# Patient Record
Sex: FEMALE | Race: WHITE | NOT HISPANIC OR LATINO | Employment: OTHER | URBAN - METROPOLITAN AREA
[De-identification: names, ages, dates, MRNs, and addresses within clinical notes are randomized per-mention and may not be internally consistent; named-entity substitution may affect disease eponyms.]

---

## 2017-01-01 ENCOUNTER — TELEPHONE (OUTPATIENT)
Dept: ONCOLOGY | Facility: HOSPITAL | Age: 82
End: 2017-01-01

## 2017-01-01 ENCOUNTER — APPOINTMENT (OUTPATIENT)
Dept: GENERAL RADIOLOGY | Facility: HOSPITAL | Age: 82
End: 2017-01-01

## 2017-01-01 ENCOUNTER — INFUSION (OUTPATIENT)
Dept: ONCOLOGY | Facility: HOSPITAL | Age: 82
End: 2017-01-01

## 2017-01-01 ENCOUNTER — APPOINTMENT (OUTPATIENT)
Dept: ONCOLOGY | Facility: CLINIC | Age: 82
End: 2017-01-01

## 2017-01-01 ENCOUNTER — OFFICE VISIT (OUTPATIENT)
Dept: ONCOLOGY | Facility: CLINIC | Age: 82
End: 2017-01-01

## 2017-01-01 ENCOUNTER — APPOINTMENT (OUTPATIENT)
Dept: CT IMAGING | Facility: HOSPITAL | Age: 82
End: 2017-01-01

## 2017-01-01 ENCOUNTER — APPOINTMENT (OUTPATIENT)
Dept: ONCOLOGY | Facility: HOSPITAL | Age: 82
End: 2017-01-01

## 2017-01-01 ENCOUNTER — APPOINTMENT (OUTPATIENT)
Dept: LAB | Facility: HOSPITAL | Age: 82
End: 2017-01-01

## 2017-01-01 ENCOUNTER — LAB (OUTPATIENT)
Dept: LAB | Facility: HOSPITAL | Age: 82
End: 2017-01-01

## 2017-01-01 ENCOUNTER — HOSPITAL ENCOUNTER (OUTPATIENT)
Dept: ULTRASOUND IMAGING | Facility: HOSPITAL | Age: 82
Discharge: HOME | End: 2017-04-06
Attending: INTERNAL MEDICINE | Admitting: INTERNAL MEDICINE

## 2017-01-01 ENCOUNTER — ANESTHESIA (OUTPATIENT)
Dept: GASTROENTEROLOGY | Facility: HOSPITAL | Age: 82
End: 2017-01-01

## 2017-01-01 ENCOUNTER — APPOINTMENT (OUTPATIENT)
Dept: MRI IMAGING | Facility: HOSPITAL | Age: 82
End: 2017-01-01

## 2017-01-01 ENCOUNTER — TELEPHONE (OUTPATIENT)
Dept: ONCOLOGY | Facility: CLINIC | Age: 82
End: 2017-01-01

## 2017-01-01 ENCOUNTER — HOSPITAL ENCOUNTER (OUTPATIENT)
Dept: PET IMAGING | Facility: HOSPITAL | Age: 82
Discharge: HOME | End: 2017-03-21
Attending: INTERNAL MEDICINE | Admitting: INTERNAL MEDICINE

## 2017-01-01 ENCOUNTER — APPOINTMENT (OUTPATIENT)
Dept: ULTRASOUND IMAGING | Facility: HOSPITAL | Age: 82
End: 2017-01-01

## 2017-01-01 ENCOUNTER — HOSPITAL ENCOUNTER (INPATIENT)
Facility: HOSPITAL | Age: 82
LOS: 3 days | Discharge: HOME | End: 2017-03-07
Attending: EMERGENCY MEDICINE | Admitting: INTERNAL MEDICINE

## 2017-01-01 ENCOUNTER — HOSPITAL ENCOUNTER (INPATIENT)
Facility: HOSPITAL | Age: 82
LOS: 3 days | End: 2017-04-11
Attending: EMERGENCY MEDICINE | Admitting: INTERNAL MEDICINE

## 2017-01-01 ENCOUNTER — HOSPITAL ENCOUNTER (INPATIENT)
Facility: HOSPITAL | Age: 82
LOS: 3 days | Discharge: HOME | End: 2017-03-27
Attending: INTERNAL MEDICINE | Admitting: INTERNAL MEDICINE

## 2017-01-01 ENCOUNTER — APPOINTMENT (OUTPATIENT)
Dept: CARDIOLOGY | Facility: HOSPITAL | Age: 82
End: 2017-01-01
Attending: INTERNAL MEDICINE

## 2017-01-01 ENCOUNTER — ANESTHESIA EVENT (OUTPATIENT)
Dept: GASTROENTEROLOGY | Facility: HOSPITAL | Age: 82
End: 2017-01-01

## 2017-01-01 VITALS
BODY MASS INDEX: 22.53 KG/M2 | HEIGHT: 64 IN | OXYGEN SATURATION: 96 % | SYSTOLIC BLOOD PRESSURE: 125 MMHG | TEMPERATURE: 98.1 F | HEART RATE: 95 BPM | DIASTOLIC BLOOD PRESSURE: 84 MMHG | WEIGHT: 132 LBS | RESPIRATION RATE: 18 BRPM

## 2017-01-01 VITALS
WEIGHT: 139.5 LBS | RESPIRATION RATE: 18 BRPM | SYSTOLIC BLOOD PRESSURE: 97 MMHG | OXYGEN SATURATION: 95 % | HEIGHT: 64 IN | DIASTOLIC BLOOD PRESSURE: 63 MMHG | BODY MASS INDEX: 23.82 KG/M2 | TEMPERATURE: 97 F | HEART RATE: 93 BPM

## 2017-01-01 VITALS
OXYGEN SATURATION: 90 % | BODY MASS INDEX: 23.87 KG/M2 | HEIGHT: 64 IN | DIASTOLIC BLOOD PRESSURE: 54 MMHG | SYSTOLIC BLOOD PRESSURE: 89 MMHG | TEMPERATURE: 97 F | WEIGHT: 139.8 LBS

## 2017-01-01 VITALS
HEART RATE: 104 BPM | RESPIRATION RATE: 12 BRPM | TEMPERATURE: 97.5 F | DIASTOLIC BLOOD PRESSURE: 70 MMHG | BODY MASS INDEX: 21.81 KG/M2 | HEIGHT: 63 IN | OXYGEN SATURATION: 100 % | SYSTOLIC BLOOD PRESSURE: 104 MMHG | WEIGHT: 123.1 LBS

## 2017-01-01 VITALS
BODY MASS INDEX: 21.46 KG/M2 | SYSTOLIC BLOOD PRESSURE: 112 MMHG | HEIGHT: 63 IN | HEART RATE: 82 BPM | DIASTOLIC BLOOD PRESSURE: 62 MMHG | RESPIRATION RATE: 12 BRPM | TEMPERATURE: 98.3 F | WEIGHT: 121.1 LBS

## 2017-01-01 VITALS
DIASTOLIC BLOOD PRESSURE: 62 MMHG | OXYGEN SATURATION: 99 % | BODY MASS INDEX: 22.19 KG/M2 | RESPIRATION RATE: 16 BRPM | SYSTOLIC BLOOD PRESSURE: 112 MMHG | TEMPERATURE: 97.5 F | HEART RATE: 95 BPM | HEIGHT: 62 IN | WEIGHT: 120.6 LBS

## 2017-01-01 VITALS
HEART RATE: 82 BPM | SYSTOLIC BLOOD PRESSURE: 110 MMHG | BODY MASS INDEX: 25.73 KG/M2 | TEMPERATURE: 97.9 F | RESPIRATION RATE: 18 BRPM | HEIGHT: 62 IN | DIASTOLIC BLOOD PRESSURE: 70 MMHG | WEIGHT: 139.8 LBS

## 2017-01-01 VITALS
BODY MASS INDEX: 21.84 KG/M2 | WEIGHT: 119.4 LBS | DIASTOLIC BLOOD PRESSURE: 75 MMHG | TEMPERATURE: 98.3 F | HEART RATE: 105 BPM | SYSTOLIC BLOOD PRESSURE: 127 MMHG

## 2017-01-01 VITALS
RESPIRATION RATE: 14 BRPM | HEART RATE: 104 BPM | BODY MASS INDEX: 21.86 KG/M2 | OXYGEN SATURATION: 98 % | SYSTOLIC BLOOD PRESSURE: 100 MMHG | DIASTOLIC BLOOD PRESSURE: 62 MMHG | WEIGHT: 123.4 LBS | TEMPERATURE: 97.5 F | HEIGHT: 63 IN

## 2017-01-01 VITALS
DIASTOLIC BLOOD PRESSURE: 77 MMHG | WEIGHT: 120.4 LBS | HEART RATE: 111 BPM | SYSTOLIC BLOOD PRESSURE: 120 MMHG | TEMPERATURE: 98 F | BODY MASS INDEX: 21.33 KG/M2

## 2017-01-01 VITALS
BODY MASS INDEX: 24.8 KG/M2 | HEIGHT: 62 IN | HEART RATE: 89 BPM | OXYGEN SATURATION: 98 % | SYSTOLIC BLOOD PRESSURE: 112 MMHG | TEMPERATURE: 97.5 F | RESPIRATION RATE: 16 BRPM | WEIGHT: 134.8 LBS | DIASTOLIC BLOOD PRESSURE: 58 MMHG

## 2017-01-01 VITALS
HEIGHT: 62 IN | BODY MASS INDEX: 25.32 KG/M2 | SYSTOLIC BLOOD PRESSURE: 138 MMHG | WEIGHT: 137.6 LBS | RESPIRATION RATE: 16 BRPM | TEMPERATURE: 97.6 F | DIASTOLIC BLOOD PRESSURE: 74 MMHG | OXYGEN SATURATION: 97 % | HEART RATE: 104 BPM

## 2017-01-01 VITALS
DIASTOLIC BLOOD PRESSURE: 70 MMHG | OXYGEN SATURATION: 97 % | RESPIRATION RATE: 14 BRPM | HEIGHT: 63 IN | WEIGHT: 122.4 LBS | HEART RATE: 104 BPM | SYSTOLIC BLOOD PRESSURE: 116 MMHG | TEMPERATURE: 97.4 F | BODY MASS INDEX: 21.69 KG/M2

## 2017-01-01 VITALS
DIASTOLIC BLOOD PRESSURE: 74 MMHG | RESPIRATION RATE: 16 BRPM | SYSTOLIC BLOOD PRESSURE: 112 MMHG | WEIGHT: 129.7 LBS | HEIGHT: 62 IN | BODY MASS INDEX: 23.87 KG/M2 | HEART RATE: 84 BPM | TEMPERATURE: 97.6 F | OXYGEN SATURATION: 100 %

## 2017-01-01 DIAGNOSIS — C22.1 CHOLANGIOCARCINOMA (HCC): ICD-10-CM

## 2017-01-01 DIAGNOSIS — T45.1X5A CHEMOTHERAPY-INDUCED THROMBOCYTOPENIA: ICD-10-CM

## 2017-01-01 DIAGNOSIS — C22.1 CHOLANGIOCARCINOMA (HCC): Primary | ICD-10-CM

## 2017-01-01 DIAGNOSIS — E11.9 TYPE 2 DIABETES MELLITUS WITHOUT COMPLICATION, WITHOUT LONG-TERM CURRENT USE OF INSULIN (HCC): ICD-10-CM

## 2017-01-01 DIAGNOSIS — Z45.2 ENCOUNTER FOR ADJUSTMENT OR MANAGEMENT OF VASCULAR ACCESS DEVICE: Primary | ICD-10-CM

## 2017-01-01 DIAGNOSIS — E80.6 HYPERBILIRUBINEMIA: ICD-10-CM

## 2017-01-01 DIAGNOSIS — R53.1 WEAKNESS: ICD-10-CM

## 2017-01-01 DIAGNOSIS — R18.0 MALIGNANT ASCITES: Primary | ICD-10-CM

## 2017-01-01 DIAGNOSIS — R18.8 OTHER ASCITES: ICD-10-CM

## 2017-01-01 DIAGNOSIS — E83.42 HYPOMAGNESEMIA: ICD-10-CM

## 2017-01-01 DIAGNOSIS — G89.3 CANCER RELATED PAIN: ICD-10-CM

## 2017-01-01 DIAGNOSIS — N39.0 ACUTE UTI: Primary | ICD-10-CM

## 2017-01-01 DIAGNOSIS — E83.39 HYPERPHOSPHATEMIA: ICD-10-CM

## 2017-01-01 DIAGNOSIS — N39.0 UTI (URINARY TRACT INFECTION) WITH PYURIA: ICD-10-CM

## 2017-01-01 DIAGNOSIS — T45.1X5A ANEMIA ASSOCIATED WITH CHEMOTHERAPY: ICD-10-CM

## 2017-01-01 DIAGNOSIS — N17.9 ACUTE RENAL FAILURE, UNSPECIFIED ACUTE RENAL FAILURE TYPE (HCC): Primary | ICD-10-CM

## 2017-01-01 DIAGNOSIS — D64.81 ANEMIA ASSOCIATED WITH CHEMOTHERAPY: ICD-10-CM

## 2017-01-01 DIAGNOSIS — E80.6 HYPERBILIRUBINEMIA: Primary | ICD-10-CM

## 2017-01-01 DIAGNOSIS — A41.9 SEPSIS, DUE TO UNSPECIFIED ORGANISM: ICD-10-CM

## 2017-01-01 DIAGNOSIS — R53.1 GENERALIZED WEAKNESS: ICD-10-CM

## 2017-01-01 DIAGNOSIS — I48.91 ATRIAL FIBRILLATION WITH RVR (HCC): ICD-10-CM

## 2017-01-01 DIAGNOSIS — K83.1 BILE DUCT OBSTRUCTION: ICD-10-CM

## 2017-01-01 DIAGNOSIS — R50.9 FEVER, UNSPECIFIED FEVER CAUSE: ICD-10-CM

## 2017-01-01 DIAGNOSIS — D69.59 CHEMOTHERAPY-INDUCED THROMBOCYTOPENIA: ICD-10-CM

## 2017-01-01 DIAGNOSIS — M79.89 SWELLING OF LOWER EXTREMITY: ICD-10-CM

## 2017-01-01 DIAGNOSIS — D72.829 LEUKOCYTOSIS, UNSPECIFIED TYPE: ICD-10-CM

## 2017-01-01 LAB
ABO + RH BLD: NORMAL
ABO + RH BLD: NORMAL
ABO GROUP BLD: NORMAL
ACETONE BLD QL: NEGATIVE
ALBUMIN FLD-MCNC: 0.5 G/DL
ALBUMIN SERPL-MCNC: 1.8 G/DL (ref 3.5–5.2)
ALBUMIN SERPL-MCNC: 1.8 G/DL (ref 3.5–5.2)
ALBUMIN SERPL-MCNC: 1.9 G/DL (ref 3.5–5.2)
ALBUMIN SERPL-MCNC: 2 G/DL (ref 3.5–5.2)
ALBUMIN SERPL-MCNC: 2.1 G/DL (ref 3.5–5.2)
ALBUMIN SERPL-MCNC: 2.2 G/DL (ref 3.5–5.2)
ALBUMIN SERPL-MCNC: 2.3 G/DL (ref 3.5–5.2)
ALBUMIN SERPL-MCNC: 2.4 G/DL (ref 3.5–5.2)
ALBUMIN SERPL-MCNC: 2.4 G/DL (ref 3.5–5.2)
ALBUMIN SERPL-MCNC: 2.5 G/DL (ref 3.5–5.2)
ALBUMIN SERPL-MCNC: 2.5 G/DL (ref 3.5–5.2)
ALBUMIN SERPL-MCNC: 2.6 G/DL (ref 3.5–5.2)
ALBUMIN SERPL-MCNC: 2.6 G/DL (ref 3.5–5.2)
ALBUMIN SERPL-MCNC: 2.7 G/DL (ref 3.5–5.2)
ALBUMIN/GLOB SERPL: 0.5 G/DL
ALBUMIN/GLOB SERPL: 0.5 G/DL (ref 1.1–2.4)
ALBUMIN/GLOB SERPL: 0.6 G/DL
ALBUMIN/GLOB SERPL: 0.6 G/DL (ref 1.1–2.4)
ALBUMIN/GLOB SERPL: 0.7 G/DL (ref 1.1–2.4)
ALP SERPL-CCNC: 157 U/L (ref 39–117)
ALP SERPL-CCNC: 170 U/L (ref 39–117)
ALP SERPL-CCNC: 173 U/L (ref 39–117)
ALP SERPL-CCNC: 176 U/L (ref 39–117)
ALP SERPL-CCNC: 183 U/L (ref 39–117)
ALP SERPL-CCNC: 184 U/L (ref 39–117)
ALP SERPL-CCNC: 186 U/L (ref 39–117)
ALP SERPL-CCNC: 188 U/L (ref 38–116)
ALP SERPL-CCNC: 190 U/L (ref 38–116)
ALP SERPL-CCNC: 191 U/L (ref 38–116)
ALP SERPL-CCNC: 195 U/L (ref 38–116)
ALP SERPL-CCNC: 203 U/L (ref 38–116)
ALP SERPL-CCNC: 204 U/L (ref 38–116)
ALP SERPL-CCNC: 205 U/L (ref 38–116)
ALP SERPL-CCNC: 216 U/L (ref 38–116)
ALP SERPL-CCNC: 219 U/L (ref 39–117)
ALP SERPL-CCNC: 225 U/L (ref 39–117)
ALP SERPL-CCNC: 229 U/L (ref 38–116)
ALT SERPL W P-5'-P-CCNC: 10 U/L (ref 0–33)
ALT SERPL W P-5'-P-CCNC: 15 U/L (ref 0–33)
ALT SERPL W P-5'-P-CCNC: 15 U/L (ref 0–33)
ALT SERPL W P-5'-P-CCNC: 15 U/L (ref 1–33)
ALT SERPL W P-5'-P-CCNC: 17 U/L (ref 1–33)
ALT SERPL W P-5'-P-CCNC: 18 U/L (ref 1–33)
ALT SERPL W P-5'-P-CCNC: 19 U/L (ref 0–33)
ALT SERPL W P-5'-P-CCNC: 21 U/L (ref 0–33)
ALT SERPL W P-5'-P-CCNC: 22 U/L (ref 0–33)
ALT SERPL W P-5'-P-CCNC: 22 U/L (ref 0–33)
ALT SERPL W P-5'-P-CCNC: 22 U/L (ref 1–33)
ALT SERPL W P-5'-P-CCNC: 23 U/L (ref 0–33)
ALT SERPL W P-5'-P-CCNC: 23 U/L (ref 1–33)
ALT SERPL W P-5'-P-CCNC: 24 U/L (ref 1–33)
ALT SERPL W P-5'-P-CCNC: 25 U/L (ref 1–33)
ALT SERPL W P-5'-P-CCNC: 25 U/L (ref 1–33)
ALT SERPL W P-5'-P-CCNC: 31 U/L (ref 1–33)
ALT SERPL W P-5'-P-CCNC: 9 U/L (ref 0–33)
AMORPH URATE CRY URNS QL MICRO: ABNORMAL /HPF
ANION GAP SERPL CALCULATED.3IONS-SCNC: 10.3 MMOL/L
ANION GAP SERPL CALCULATED.3IONS-SCNC: 10.4 MMOL/L
ANION GAP SERPL CALCULATED.3IONS-SCNC: 10.4 MMOL/L
ANION GAP SERPL CALCULATED.3IONS-SCNC: 10.6 MMOL/L
ANION GAP SERPL CALCULATED.3IONS-SCNC: 10.9 MMOL/L
ANION GAP SERPL CALCULATED.3IONS-SCNC: 10.9 MMOL/L
ANION GAP SERPL CALCULATED.3IONS-SCNC: 11.2 MMOL/L
ANION GAP SERPL CALCULATED.3IONS-SCNC: 11.3 MMOL/L
ANION GAP SERPL CALCULATED.3IONS-SCNC: 11.5 MMOL/L
ANION GAP SERPL CALCULATED.3IONS-SCNC: 11.5 MMOL/L
ANION GAP SERPL CALCULATED.3IONS-SCNC: 11.7 MMOL/L
ANION GAP SERPL CALCULATED.3IONS-SCNC: 12.1 MMOL/L
ANION GAP SERPL CALCULATED.3IONS-SCNC: 12.9 MMOL/L
ANION GAP SERPL CALCULATED.3IONS-SCNC: 13.3 MMOL/L
ANION GAP SERPL CALCULATED.3IONS-SCNC: 15 MMOL/L
ANION GAP SERPL CALCULATED.3IONS-SCNC: 9.4 MMOL/L
ANION GAP SERPL CALCULATED.3IONS-SCNC: 9.6 MMOL/L
ANION GAP SERPL CALCULATED.3IONS-SCNC: 9.9 MMOL/L
ANION GAP SERPL CALCULATED.3IONS-SCNC: 9.9 MMOL/L
ANISOCYTOSIS BLD QL: ABNORMAL
APPEARANCE FLD: ABNORMAL
APTT PPP: 40.3 SECONDS (ref 22.7–35.4)
AST SERPL-CCNC: 20 U/L (ref 0–32)
AST SERPL-CCNC: 20 U/L (ref 0–32)
AST SERPL-CCNC: 26 U/L (ref 1–32)
AST SERPL-CCNC: 27 U/L (ref 1–32)
AST SERPL-CCNC: 28 U/L (ref 1–32)
AST SERPL-CCNC: 29 U/L (ref 0–32)
AST SERPL-CCNC: 30 U/L (ref 0–32)
AST SERPL-CCNC: 32 U/L (ref 0–32)
AST SERPL-CCNC: 33 U/L (ref 1–32)
AST SERPL-CCNC: 35 U/L (ref 0–32)
AST SERPL-CCNC: 37 U/L (ref 1–32)
AST SERPL-CCNC: 39 U/L (ref 0–32)
AST SERPL-CCNC: 40 U/L (ref 0–32)
AST SERPL-CCNC: 40 U/L (ref 1–32)
AST SERPL-CCNC: 40 U/L (ref 1–32)
AST SERPL-CCNC: 41 U/L (ref 0–32)
AST SERPL-CCNC: 44 U/L (ref 1–32)
AST SERPL-CCNC: 53 U/L (ref 1–32)
BACTERIA BLD CULT: ABNORMAL
BACTERIA FLD CULT: NORMAL
BACTERIA SPEC AEROBE CULT: ABNORMAL
BACTERIA SPEC AEROBE CULT: ABNORMAL
BACTERIA SPEC AEROBE CULT: NORMAL
BACTERIA UR QL AUTO: ABNORMAL /HPF
BACTERIA UR QL AUTO: ABNORMAL /HPF
BASOPHILS # BLD AUTO: 0 10*3/MM3 (ref 0–0.2)
BASOPHILS # BLD AUTO: 0.01 10*3/MM3 (ref 0–0.1)
BASOPHILS # BLD AUTO: 0.01 10*3/MM3 (ref 0–0.2)
BASOPHILS # BLD AUTO: 0.01 10*3/MM3 (ref 0–0.2)
BASOPHILS # BLD AUTO: 0.02 10*3/MM3 (ref 0–0.1)
BASOPHILS # BLD AUTO: 0.02 10*3/MM3 (ref 0–0.2)
BASOPHILS # BLD AUTO: 0.03 10*3/MM3 (ref 0–0.1)
BASOPHILS # BLD AUTO: 0.03 10*3/MM3 (ref 0–0.2)
BASOPHILS # BLD AUTO: 0.03 10*3/MM3 (ref 0–0.2)
BASOPHILS # BLD AUTO: 0.04 10*3/MM3 (ref 0–0.1)
BASOPHILS # BLD AUTO: 0.04 10*3/MM3 (ref 0–0.2)
BASOPHILS # BLD AUTO: 0.04 10*3/MM3 (ref 0–0.2)
BASOPHILS # BLD AUTO: 0.05 10*3/MM3 (ref 0–0.1)
BASOPHILS # BLD AUTO: 0.07 10*3/MM3 (ref 0–0.1)
BASOPHILS # BLD AUTO: 0.08 10*3/MM3 (ref 0–0.1)
BASOPHILS # BLD AUTO: 0.09 10*3/MM3 (ref 0–0.1)
BASOPHILS NFR BLD AUTO: 0 % (ref 0–1.5)
BASOPHILS NFR BLD AUTO: 0.1 % (ref 0–1.1)
BASOPHILS NFR BLD AUTO: 0.1 % (ref 0–1.5)
BASOPHILS NFR BLD AUTO: 0.1 % (ref 0–1.5)
BASOPHILS NFR BLD AUTO: 0.2 % (ref 0–1.1)
BASOPHILS NFR BLD AUTO: 0.2 % (ref 0–1.5)
BASOPHILS NFR BLD AUTO: 0.4 % (ref 0–1.1)
BASOPHILS NFR BLD AUTO: 0.4 % (ref 0–1.5)
BASOPHILS NFR BLD AUTO: 0.4 % (ref 0–1.5)
BASOPHILS NFR BLD AUTO: 0.5 % (ref 0–1.5)
BASOPHILS NFR BLD AUTO: 0.5 % (ref 0–1.5)
BASOPHILS NFR BLD AUTO: 0.6 % (ref 0–1.1)
BASOPHILS NFR BLD AUTO: 0.6 % (ref 0–1.1)
BASOPHILS NFR BLD AUTO: 0.7 % (ref 0–1.1)
BASOPHILS NFR BLD AUTO: 1.2 % (ref 0–1.1)
BH BB BLOOD EXPIRATION DATE: NORMAL
BH BB BLOOD EXPIRATION DATE: NORMAL
BH BB BLOOD TYPE BARCODE: 5100
BH BB BLOOD TYPE BARCODE: 5100
BH BB DISPENSE STATUS: NORMAL
BH BB DISPENSE STATUS: NORMAL
BH BB PRODUCT CODE: NORMAL
BH BB PRODUCT CODE: NORMAL
BH BB UNIT NUMBER: NORMAL
BH BB UNIT NUMBER: NORMAL
BILIRUB SERPL-MCNC: 1.1 MG/DL (ref 0.1–1.2)
BILIRUB SERPL-MCNC: 1.4 MG/DL (ref 0.1–1.2)
BILIRUB SERPL-MCNC: 1.5 MG/DL (ref 0.1–1.2)
BILIRUB SERPL-MCNC: 1.6 MG/DL (ref 0.1–1.2)
BILIRUB SERPL-MCNC: 1.6 MG/DL (ref 0.1–1.2)
BILIRUB SERPL-MCNC: 1.8 MG/DL (ref 0.1–1.2)
BILIRUB SERPL-MCNC: 2 MG/DL (ref 0.1–1.2)
BILIRUB SERPL-MCNC: 2.2 MG/DL (ref 0.1–1.2)
BILIRUB SERPL-MCNC: 2.4 MG/DL (ref 0.1–1.2)
BILIRUB SERPL-MCNC: 2.5 MG/DL (ref 0.1–1.2)
BILIRUB SERPL-MCNC: 4.2 MG/DL (ref 0.1–1.2)
BILIRUB SERPL-MCNC: 4.3 MG/DL (ref 0.1–1.2)
BILIRUB SERPL-MCNC: 5.7 MG/DL (ref 0.1–1.2)
BILIRUB SERPL-MCNC: 6.1 MG/DL (ref 0.1–1.2)
BILIRUB SERPL-MCNC: 7.2 MG/DL (ref 0.1–1.2)
BILIRUB SERPL-MCNC: 7.7 MG/DL (ref 0.1–1.2)
BILIRUB SERPL-MCNC: 8 MG/DL (ref 0.1–1.2)
BILIRUB SERPL-MCNC: 8.2 MG/DL (ref 0.1–1.2)
BILIRUB UR QL STRIP: ABNORMAL
BILIRUB UR QL STRIP: ABNORMAL
BILIRUB UR QL STRIP: NEGATIVE
BLD GP AB SCN SERPL QL: NEGATIVE
BUN BLD-MCNC: 10 MG/DL (ref 6–20)
BUN BLD-MCNC: 11 MG/DL (ref 6–20)
BUN BLD-MCNC: 12 MG/DL (ref 6–20)
BUN BLD-MCNC: 12 MG/DL (ref 8–23)
BUN BLD-MCNC: 13 MG/DL (ref 6–20)
BUN BLD-MCNC: 13 MG/DL (ref 6–20)
BUN BLD-MCNC: 14 MG/DL (ref 6–20)
BUN BLD-MCNC: 15 MG/DL (ref 8–23)
BUN BLD-MCNC: 16 MG/DL (ref 8–23)
BUN BLD-MCNC: 18 MG/DL (ref 6–20)
BUN BLD-MCNC: 19 MG/DL (ref 6–20)
BUN BLD-MCNC: 48 MG/DL (ref 8–23)
BUN BLD-MCNC: 52 MG/DL (ref 8–23)
BUN BLD-MCNC: 55 MG/DL (ref 8–23)
BUN BLD-MCNC: 8 MG/DL (ref 6–20)
BUN BLD-MCNC: 8 MG/DL (ref 8–23)
BUN BLD-MCNC: 9 MG/DL (ref 8–23)
BUN/CREAT SERPL: 16 (ref 7–25)
BUN/CREAT SERPL: 16.7 (ref 7.3–30)
BUN/CREAT SERPL: 18 (ref 7–25)
BUN/CREAT SERPL: 19.5 (ref 7–25)
BUN/CREAT SERPL: 20.4 (ref 7.3–30)
BUN/CREAT SERPL: 20.4 (ref 7.3–30)
BUN/CREAT SERPL: 21.1 (ref 7–25)
BUN/CREAT SERPL: 21.8 (ref 7.3–30)
BUN/CREAT SERPL: 22 (ref 7.3–30)
BUN/CREAT SERPL: 22 (ref 7.3–30)
BUN/CREAT SERPL: 22.1 (ref 7–25)
BUN/CREAT SERPL: 22.4 (ref 7–25)
BUN/CREAT SERPL: 24.1 (ref 7.3–30)
BUN/CREAT SERPL: 28.1 (ref 7.3–30)
BUN/CREAT SERPL: 28.3 (ref 7–25)
BUN/CREAT SERPL: 29.5 (ref 7.3–30)
BUN/CREAT SERPL: 35.7 (ref 7–25)
BUN/CREAT SERPL: 36.6 (ref 7–25)
BUN/CREAT SERPL: 40.9 (ref 7–25)
CALCIUM SPEC-SCNC: 7.6 MG/DL (ref 8.6–10.5)
CALCIUM SPEC-SCNC: 7.8 MG/DL (ref 8.6–10.5)
CALCIUM SPEC-SCNC: 7.8 MG/DL (ref 8.6–10.5)
CALCIUM SPEC-SCNC: 7.9 MG/DL (ref 8.5–10.2)
CALCIUM SPEC-SCNC: 7.9 MG/DL (ref 8.5–10.2)
CALCIUM SPEC-SCNC: 7.9 MG/DL (ref 8.6–10.5)
CALCIUM SPEC-SCNC: 7.9 MG/DL (ref 8.6–10.5)
CALCIUM SPEC-SCNC: 8.1 MG/DL (ref 8.5–10.2)
CALCIUM SPEC-SCNC: 8.1 MG/DL (ref 8.6–10.5)
CALCIUM SPEC-SCNC: 8.1 MG/DL (ref 8.6–10.5)
CALCIUM SPEC-SCNC: 8.2 MG/DL (ref 8.5–10.2)
CALCIUM SPEC-SCNC: 8.2 MG/DL (ref 8.5–10.2)
CALCIUM SPEC-SCNC: 8.2 MG/DL (ref 8.6–10.5)
CALCIUM SPEC-SCNC: 8.4 MG/DL (ref 8.5–10.2)
CALCIUM SPEC-SCNC: 8.5 MG/DL (ref 8.5–10.2)
CALCIUM SPEC-SCNC: 8.6 MG/DL (ref 8.5–10.2)
CALCIUM SPEC-SCNC: 8.7 MG/DL (ref 8.5–10.2)
CALCIUM SPEC-SCNC: 8.7 MG/DL (ref 8.6–10.5)
CALCIUM SPEC-SCNC: 8.8 MG/DL (ref 8.6–10.5)
CHLORIDE SERPL-SCNC: 100 MMOL/L (ref 98–107)
CHLORIDE SERPL-SCNC: 100 MMOL/L (ref 98–107)
CHLORIDE SERPL-SCNC: 101 MMOL/L (ref 98–107)
CHLORIDE SERPL-SCNC: 102 MMOL/L (ref 98–107)
CHLORIDE SERPL-SCNC: 102 MMOL/L (ref 98–107)
CHLORIDE SERPL-SCNC: 103 MMOL/L (ref 98–107)
CHLORIDE SERPL-SCNC: 94 MMOL/L (ref 98–107)
CHLORIDE SERPL-SCNC: 95 MMOL/L (ref 98–107)
CHLORIDE SERPL-SCNC: 95 MMOL/L (ref 98–107)
CHLORIDE SERPL-SCNC: 96 MMOL/L (ref 98–107)
CHLORIDE SERPL-SCNC: 96 MMOL/L (ref 98–107)
CHLORIDE SERPL-SCNC: 98 MMOL/L (ref 98–107)
CHLORIDE SERPL-SCNC: 98 MMOL/L (ref 98–107)
CHLORIDE SERPL-SCNC: 99 MMOL/L (ref 98–107)
CHLORIDE UR-SCNC: 34 MMOL/L
CK SERPL-CCNC: 14 U/L (ref 20–180)
CLARITY UR: ABNORMAL
CO2 SERPL-SCNC: 17.7 MMOL/L (ref 22–29)
CO2 SERPL-SCNC: 17.7 MMOL/L (ref 22–29)
CO2 SERPL-SCNC: 18 MMOL/L (ref 22–29)
CO2 SERPL-SCNC: 20.1 MMOL/L (ref 22–29)
CO2 SERPL-SCNC: 20.6 MMOL/L (ref 22–29)
CO2 SERPL-SCNC: 21.5 MMOL/L (ref 22–29)
CO2 SERPL-SCNC: 21.7 MMOL/L (ref 22–29)
CO2 SERPL-SCNC: 21.9 MMOL/L (ref 22–29)
CO2 SERPL-SCNC: 22.1 MMOL/L (ref 22–29)
CO2 SERPL-SCNC: 22.3 MMOL/L (ref 22–29)
CO2 SERPL-SCNC: 22.4 MMOL/L (ref 22–29)
CO2 SERPL-SCNC: 22.8 MMOL/L (ref 22–29)
CO2 SERPL-SCNC: 23.1 MMOL/L (ref 22–29)
CO2 SERPL-SCNC: 23.4 MMOL/L (ref 22–29)
CO2 SERPL-SCNC: 23.6 MMOL/L (ref 22–29)
CO2 SERPL-SCNC: 24.1 MMOL/L (ref 22–29)
CO2 SERPL-SCNC: 25.1 MMOL/L (ref 22–29)
CO2 SERPL-SCNC: 25.5 MMOL/L (ref 22–29)
CO2 SERPL-SCNC: 26.6 MMOL/L (ref 22–29)
COLOR FLD: YELLOW
COLOR UR: ABNORMAL
CREAT BLD-MCNC: 0.44 MG/DL (ref 0.6–1.1)
CREAT BLD-MCNC: 0.48 MG/DL (ref 0.6–1.1)
CREAT BLD-MCNC: 0.49 MG/DL (ref 0.6–1.1)
CREAT BLD-MCNC: 0.5 MG/DL (ref 0.57–1)
CREAT BLD-MCNC: 0.5 MG/DL (ref 0.57–1)
CREAT BLD-MCNC: 0.5 MG/DL (ref 0.6–1.1)
CREAT BLD-MCNC: 0.53 MG/DL (ref 0.57–1)
CREAT BLD-MCNC: 0.55 MG/DL (ref 0.6–1.1)
CREAT BLD-MCNC: 0.57 MG/DL (ref 0.57–1)
CREAT BLD-MCNC: 0.58 MG/DL (ref 0.6–1.1)
CREAT BLD-MCNC: 0.59 MG/DL (ref 0.6–1.1)
CREAT BLD-MCNC: 0.64 MG/DL (ref 0.6–1.1)
CREAT BLD-MCNC: 0.67 MG/DL (ref 0.57–1)
CREAT BLD-MCNC: 0.68 MG/DL (ref 0.57–1)
CREAT BLD-MCNC: 0.82 MG/DL (ref 0.57–1)
CREAT BLD-MCNC: 0.93 MG/DL (ref 0.6–1.1)
CREAT BLD-MCNC: 1.27 MG/DL (ref 0.57–1)
CREAT BLD-MCNC: 1.31 MG/DL (ref 0.57–1)
CREAT BLD-MCNC: 1.54 MG/DL (ref 0.57–1)
CREAT BLDA-MCNC: 0.4 MG/DL (ref 0.6–1.3)
CREAT UR-MCNC: 115.7 MG/DL
CYTO UR: NORMAL
D-LACTATE SERPL-SCNC: 1.8 MMOL/L (ref 0.5–2)
D-LACTATE SERPL-SCNC: 2.9 MMOL/L (ref 0.5–2)
D-LACTATE SERPL-SCNC: 3.7 MMOL/L (ref 0.5–2)
D-LACTATE SERPL-SCNC: 4.4 MMOL/L (ref 0.5–2)
DACRYOCYTES BLD QL SMEAR: NORMAL
DEPRECATED RDW RBC AUTO: 48.8 FL (ref 37–49)
DEPRECATED RDW RBC AUTO: 51.4 FL (ref 37–49)
DEPRECATED RDW RBC AUTO: 55.5 FL (ref 37–49)
DEPRECATED RDW RBC AUTO: 57.7 FL (ref 37–49)
DEPRECATED RDW RBC AUTO: 60.9 FL (ref 37–49)
DEPRECATED RDW RBC AUTO: 66.6 FL (ref 37–54)
DEPRECATED RDW RBC AUTO: 71.6 FL (ref 37–54)
DEPRECATED RDW RBC AUTO: 72.4 FL (ref 37–54)
DEPRECATED RDW RBC AUTO: 74.9 FL (ref 37–54)
DEPRECATED RDW RBC AUTO: 76.2 FL (ref 37–54)
DEPRECATED RDW RBC AUTO: 77.2 FL (ref 37–49)
DEPRECATED RDW RBC AUTO: 77.7 FL (ref 37–54)
DEPRECATED RDW RBC AUTO: 77.9 FL (ref 37–49)
DEPRECATED RDW RBC AUTO: 79.6 FL (ref 37–49)
DEPRECATED RDW RBC AUTO: 82.3 FL (ref 37–54)
DEPRECATED RDW RBC AUTO: 82.6 FL (ref 37–49)
DEPRECATED RDW RBC AUTO: 83.5 FL (ref 37–54)
DEPRECATED RDW RBC AUTO: 83.6 FL (ref 37–54)
DEPRECATED RDW RBC AUTO: 87.1 FL (ref 37–49)
EOSINOPHIL # BLD AUTO: 0 10*3/MM3 (ref 0–0.36)
EOSINOPHIL # BLD AUTO: 0 10*3/MM3 (ref 0–0.36)
EOSINOPHIL # BLD AUTO: 0.04 10*3/MM3 (ref 0–0.36)
EOSINOPHIL # BLD AUTO: 0.04 10*3/MM3 (ref 0–0.7)
EOSINOPHIL # BLD AUTO: 0.06 10*3/MM3 (ref 0–0.36)
EOSINOPHIL # BLD AUTO: 0.07 10*3/MM3 (ref 0–0.36)
EOSINOPHIL # BLD AUTO: 0.07 10*3/MM3 (ref 0–0.7)
EOSINOPHIL # BLD AUTO: 0.08 10*3/MM3 (ref 0–0.36)
EOSINOPHIL # BLD AUTO: 0.08 10*3/MM3 (ref 0–0.36)
EOSINOPHIL # BLD AUTO: 0.1 10*3/MM3 (ref 0–0.7)
EOSINOPHIL # BLD AUTO: 0.11 10*3/MM3 (ref 0–0.36)
EOSINOPHIL # BLD AUTO: 0.11 10*3/MM3 (ref 0–0.7)
EOSINOPHIL # BLD AUTO: 0.15 10*3/MM3 (ref 0–0.36)
EOSINOPHIL # BLD AUTO: 0.15 10*3/MM3 (ref 0–0.36)
EOSINOPHIL # BLD AUTO: 0.18 10*3/MM3 (ref 0–0.7)
EOSINOPHIL # BLD AUTO: 0.19 10*3/MM3 (ref 0–0.7)
EOSINOPHIL # BLD AUTO: 0.22 10*3/MM3 (ref 0–0.7)
EOSINOPHIL # BLD AUTO: 0.25 10*3/MM3 (ref 0–0.7)
EOSINOPHIL # BLD MANUAL: 0.14 10*3/MM3 (ref 0–0.7)
EOSINOPHIL NFR BLD AUTO: 0 % (ref 1–5)
EOSINOPHIL NFR BLD AUTO: 0 % (ref 1–5)
EOSINOPHIL NFR BLD AUTO: 0.4 % (ref 1–5)
EOSINOPHIL NFR BLD AUTO: 0.5 % (ref 0.3–6.2)
EOSINOPHIL NFR BLD AUTO: 0.6 % (ref 1–5)
EOSINOPHIL NFR BLD AUTO: 0.8 % (ref 1–5)
EOSINOPHIL NFR BLD AUTO: 0.9 % (ref 0.3–6.2)
EOSINOPHIL NFR BLD AUTO: 0.9 % (ref 1–5)
EOSINOPHIL NFR BLD AUTO: 0.9 % (ref 1–5)
EOSINOPHIL NFR BLD AUTO: 1 % (ref 0.3–6.2)
EOSINOPHIL NFR BLD AUTO: 1.2 % (ref 0.3–6.2)
EOSINOPHIL NFR BLD AUTO: 1.6 % (ref 1–5)
EOSINOPHIL NFR BLD AUTO: 1.8 % (ref 0.3–6.2)
EOSINOPHIL NFR BLD AUTO: 1.9 % (ref 1–5)
EOSINOPHIL NFR BLD AUTO: 2.3 % (ref 1–5)
EOSINOPHIL NFR BLD AUTO: 3.1 % (ref 0.3–6.2)
EOSINOPHIL NFR BLD AUTO: 3.2 % (ref 0.3–6.2)
EOSINOPHIL NFR BLD AUTO: 3.3 % (ref 0.3–6.2)
EOSINOPHIL NFR BLD MANUAL: 1 % (ref 0.3–6.2)
ERYTHROCYTE [DISTWIDTH] IN BLOOD BY AUTOMATED COUNT: 14.3 % (ref 11.7–14.5)
ERYTHROCYTE [DISTWIDTH] IN BLOOD BY AUTOMATED COUNT: 15.1 % (ref 11.7–14.5)
ERYTHROCYTE [DISTWIDTH] IN BLOOD BY AUTOMATED COUNT: 15.8 % (ref 11.7–14.5)
ERYTHROCYTE [DISTWIDTH] IN BLOOD BY AUTOMATED COUNT: 17.3 % (ref 11.7–14.5)
ERYTHROCYTE [DISTWIDTH] IN BLOOD BY AUTOMATED COUNT: 17.7 % (ref 11.7–14.5)
ERYTHROCYTE [DISTWIDTH] IN BLOOD BY AUTOMATED COUNT: 18.4 % (ref 11.7–13)
ERYTHROCYTE [DISTWIDTH] IN BLOOD BY AUTOMATED COUNT: 18.7 % (ref 11.7–13)
ERYTHROCYTE [DISTWIDTH] IN BLOOD BY AUTOMATED COUNT: 19.5 % (ref 11.7–14.5)
ERYTHROCYTE [DISTWIDTH] IN BLOOD BY AUTOMATED COUNT: 20.6 % (ref 11.7–14.5)
ERYTHROCYTE [DISTWIDTH] IN BLOOD BY AUTOMATED COUNT: 21.8 % (ref 11.7–14.5)
ERYTHROCYTE [DISTWIDTH] IN BLOOD BY AUTOMATED COUNT: 22 % (ref 11.7–13)
ERYTHROCYTE [DISTWIDTH] IN BLOOD BY AUTOMATED COUNT: 22.1 % (ref 11.7–13)
ERYTHROCYTE [DISTWIDTH] IN BLOOD BY AUTOMATED COUNT: 22.4 % (ref 11.7–13)
ERYTHROCYTE [DISTWIDTH] IN BLOOD BY AUTOMATED COUNT: 22.5 % (ref 11.7–14.5)
ERYTHROCYTE [DISTWIDTH] IN BLOOD BY AUTOMATED COUNT: 23 % (ref 11.7–13)
ERYTHROCYTE [DISTWIDTH] IN BLOOD BY AUTOMATED COUNT: 23.1 % (ref 11.7–13)
ERYTHROCYTE [DISTWIDTH] IN BLOOD BY AUTOMATED COUNT: 23.2 % (ref 11.7–13)
ERYTHROCYTE [DISTWIDTH] IN BLOOD BY AUTOMATED COUNT: 23.4 % (ref 11.7–14.5)
ERYTHROCYTE [DISTWIDTH] IN BLOOD BY AUTOMATED COUNT: 23.7 % (ref 11.7–13)
FERRITIN SERPL-MCNC: 1196 NG/ML (ref 13–150)
FOLATE SERPL-MCNC: 10 NG/ML (ref 4.78–24.2)
GFR SERPL CREATININE-BSD FRML MDRD: 100 ML/MIN/1.73
GFR SERPL CREATININE-BSD FRML MDRD: 102 ML/MIN/1.73
GFR SERPL CREATININE-BSD FRML MDRD: 106 ML/MIN/1.73
GFR SERPL CREATININE-BSD FRML MDRD: 111 ML/MIN/1.73
GFR SERPL CREATININE-BSD FRML MDRD: 119 ML/MIN/1.73
GFR SERPL CREATININE-BSD FRML MDRD: 122 ML/MIN/1.73
GFR SERPL CREATININE-BSD FRML MDRD: 124 ML/MIN/1.73
GFR SERPL CREATININE-BSD FRML MDRD: 138 ML/MIN/1.73
GFR SERPL CREATININE-BSD FRML MDRD: 32 ML/MIN/1.73
GFR SERPL CREATININE-BSD FRML MDRD: 39 ML/MIN/1.73
GFR SERPL CREATININE-BSD FRML MDRD: 40 ML/MIN/1.73
GFR SERPL CREATININE-BSD FRML MDRD: 58 ML/MIN/1.73
GFR SERPL CREATININE-BSD FRML MDRD: 67 ML/MIN/1.73
GFR SERPL CREATININE-BSD FRML MDRD: 83 ML/MIN/1.73
GFR SERPL CREATININE-BSD FRML MDRD: 85 ML/MIN/1.73
GFR SERPL CREATININE-BSD FRML MDRD: 89 ML/MIN/1.73
GFR SERPL CREATININE-BSD FRML MDRD: 98 ML/MIN/1.73
GLOBULIN UR ELPH-MCNC: 3.3 GM/DL
GLOBULIN UR ELPH-MCNC: 3.4 GM/DL
GLOBULIN UR ELPH-MCNC: 3.4 GM/DL
GLOBULIN UR ELPH-MCNC: 3.5 GM/DL
GLOBULIN UR ELPH-MCNC: 3.5 GM/DL (ref 1.8–3.5)
GLOBULIN UR ELPH-MCNC: 3.6 GM/DL (ref 1.8–3.5)
GLOBULIN UR ELPH-MCNC: 3.7 GM/DL (ref 1.8–3.5)
GLOBULIN UR ELPH-MCNC: 3.8 GM/DL (ref 1.8–3.5)
GLOBULIN UR ELPH-MCNC: 3.9 GM/DL
GLOBULIN UR ELPH-MCNC: 3.9 GM/DL (ref 1.8–3.5)
GLOBULIN UR ELPH-MCNC: 4 GM/DL (ref 1.8–3.5)
GLOBULIN UR ELPH-MCNC: 4.2 GM/DL
GLOBULIN UR ELPH-MCNC: 4.2 GM/DL (ref 1.8–3.5)
GLOBULIN UR ELPH-MCNC: 4.4 GM/DL (ref 1.8–3.5)
GLOBULIN UR ELPH-MCNC: 4.5 GM/DL (ref 1.8–3.5)
GLOBULIN UR ELPH-MCNC: 4.6 GM/DL
GLUCOSE BLD-MCNC: 110 MG/DL (ref 74–124)
GLUCOSE BLD-MCNC: 123 MG/DL (ref 65–99)
GLUCOSE BLD-MCNC: 128 MG/DL (ref 65–99)
GLUCOSE BLD-MCNC: 132 MG/DL (ref 65–99)
GLUCOSE BLD-MCNC: 136 MG/DL (ref 65–99)
GLUCOSE BLD-MCNC: 143 MG/DL (ref 65–99)
GLUCOSE BLD-MCNC: 151 MG/DL (ref 74–124)
GLUCOSE BLD-MCNC: 158 MG/DL (ref 65–99)
GLUCOSE BLD-MCNC: 162 MG/DL (ref 65–99)
GLUCOSE BLD-MCNC: 163 MG/DL (ref 65–99)
GLUCOSE BLD-MCNC: 164 MG/DL (ref 74–124)
GLUCOSE BLD-MCNC: 172 MG/DL (ref 74–124)
GLUCOSE BLD-MCNC: 176 MG/DL (ref 74–124)
GLUCOSE BLD-MCNC: 184 MG/DL (ref 65–99)
GLUCOSE BLD-MCNC: 186 MG/DL (ref 74–124)
GLUCOSE BLD-MCNC: 188 MG/DL (ref 74–124)
GLUCOSE BLD-MCNC: 227 MG/DL (ref 74–124)
GLUCOSE BLD-MCNC: 268 MG/DL (ref 74–124)
GLUCOSE BLD-MCNC: 96 MG/DL (ref 65–99)
GLUCOSE BLDC GLUCOMTR-MCNC: 114 MG/DL (ref 70–130)
GLUCOSE BLDC GLUCOMTR-MCNC: 123 MG/DL (ref 70–130)
GLUCOSE BLDC GLUCOMTR-MCNC: 149 MG/DL (ref 70–130)
GLUCOSE BLDC GLUCOMTR-MCNC: 156 MG/DL (ref 70–130)
GLUCOSE UR STRIP-MCNC: ABNORMAL MG/DL
GLUCOSE UR STRIP-MCNC: NEGATIVE MG/DL
GLUCOSE UR STRIP-MCNC: NEGATIVE MG/DL
GRAM STN SPEC: NORMAL
GRAM STN SPEC: NORMAL
HCT VFR BLD AUTO: 17.8 % (ref 35.6–45.5)
HCT VFR BLD AUTO: 23.4 % (ref 35.6–45.5)
HCT VFR BLD AUTO: 24 % (ref 35.6–45.5)
HCT VFR BLD AUTO: 25.1 % (ref 35.6–45.5)
HCT VFR BLD AUTO: 26 % (ref 34–45)
HCT VFR BLD AUTO: 26.3 % (ref 35.6–45.5)
HCT VFR BLD AUTO: 26.8 % (ref 34–45)
HCT VFR BLD AUTO: 27.2 % (ref 34–45)
HCT VFR BLD AUTO: 27.3 % (ref 35.6–45.5)
HCT VFR BLD AUTO: 27.4 % (ref 34–45)
HCT VFR BLD AUTO: 27.6 % (ref 35.6–45.5)
HCT VFR BLD AUTO: 27.8 % (ref 34–45)
HCT VFR BLD AUTO: 28 % (ref 35.6–45.5)
HCT VFR BLD AUTO: 28.9 % (ref 34–45)
HCT VFR BLD AUTO: 29.9 % (ref 34–45)
HCT VFR BLD AUTO: 30 % (ref 34–45)
HCT VFR BLD AUTO: 30.9 % (ref 34–45)
HCT VFR BLD AUTO: 32.4 % (ref 34–45)
HCT VFR BLD AUTO: 33.4 % (ref 35.6–45.5)
HGB BLD-MCNC: 10 G/DL (ref 11.5–14.9)
HGB BLD-MCNC: 11.2 G/DL (ref 11.5–14.9)
HGB BLD-MCNC: 11.3 G/DL (ref 11.9–15.5)
HGB BLD-MCNC: 6.4 G/DL (ref 11.9–15.5)
HGB BLD-MCNC: 8 G/DL (ref 11.9–15.5)
HGB BLD-MCNC: 8.2 G/DL (ref 11.9–15.5)
HGB BLD-MCNC: 8.7 G/DL (ref 11.9–15.5)
HGB BLD-MCNC: 8.9 G/DL (ref 11.5–14.9)
HGB BLD-MCNC: 9 G/DL (ref 11.5–14.9)
HGB BLD-MCNC: 9.2 G/DL (ref 11.5–14.9)
HGB BLD-MCNC: 9.2 G/DL (ref 11.5–14.9)
HGB BLD-MCNC: 9.3 G/DL (ref 11.9–15.5)
HGB BLD-MCNC: 9.5 G/DL (ref 11.9–15.5)
HGB BLD-MCNC: 9.6 G/DL (ref 11.5–14.9)
HGB BLD-MCNC: 9.7 G/DL (ref 11.5–14.9)
HGB BLD-MCNC: 9.9 G/DL (ref 11.9–15.5)
HGB BLD-MCNC: 9.9 G/DL (ref 11.9–15.5)
HGB UR QL STRIP.AUTO: ABNORMAL
HGB UR QL STRIP.AUTO: NEGATIVE
HGB UR QL STRIP.AUTO: NEGATIVE
HOLD SPECIMEN: NORMAL
HOLD SPECIMEN: NORMAL
HYALINE CASTS UR QL AUTO: ABNORMAL /LPF
HYALINE CASTS UR QL AUTO: ABNORMAL /LPF
IMM GRANULOCYTES # BLD: 0 10*3/MM3 (ref 0–0.03)
IMM GRANULOCYTES # BLD: 0.02 10*3/MM3 (ref 0–0.03)
IMM GRANULOCYTES # BLD: 0.03 10*3/MM3 (ref 0–0.03)
IMM GRANULOCYTES # BLD: 0.03 10*3/MM3 (ref 0–0.03)
IMM GRANULOCYTES # BLD: 0.04 10*3/MM3 (ref 0–0.03)
IMM GRANULOCYTES # BLD: 0.04 10*3/MM3 (ref 0–0.03)
IMM GRANULOCYTES # BLD: 0.05 10*3/MM3 (ref 0–0.03)
IMM GRANULOCYTES # BLD: 0.05 10*3/MM3 (ref 0–0.03)
IMM GRANULOCYTES # BLD: 0.06 10*3/MM3 (ref 0–0.03)
IMM GRANULOCYTES # BLD: 0.07 10*3/MM3 (ref 0–0.03)
IMM GRANULOCYTES # BLD: 0.1 10*3/MM3 (ref 0–0.03)
IMM GRANULOCYTES # BLD: 0.13 10*3/MM3 (ref 0–0.03)
IMM GRANULOCYTES # BLD: 0.14 10*3/MM3 (ref 0–0.03)
IMM GRANULOCYTES # BLD: 0.22 10*3/MM3 (ref 0–0.03)
IMM GRANULOCYTES # BLD: 0.27 10*3/MM3 (ref 0–0.03)
IMM GRANULOCYTES # BLD: 1.2 10*3/MM3 (ref 0–0.03)
IMM GRANULOCYTES NFR BLD: 0 % (ref 0–0.5)
IMM GRANULOCYTES NFR BLD: 0.3 % (ref 0–0.5)
IMM GRANULOCYTES NFR BLD: 0.3 % (ref 0–0.5)
IMM GRANULOCYTES NFR BLD: 0.4 % (ref 0–0.5)
IMM GRANULOCYTES NFR BLD: 0.5 % (ref 0–0.5)
IMM GRANULOCYTES NFR BLD: 0.7 % (ref 0–0.5)
IMM GRANULOCYTES NFR BLD: 0.8 % (ref 0–0.5)
IMM GRANULOCYTES NFR BLD: 0.9 % (ref 0–0.5)
IMM GRANULOCYTES NFR BLD: 0.9 % (ref 0–0.5)
IMM GRANULOCYTES NFR BLD: 1.2 % (ref 0–0.5)
IMM GRANULOCYTES NFR BLD: 1.3 % (ref 0–0.5)
IMM GRANULOCYTES NFR BLD: 1.4 % (ref 0–0.5)
IMM GRANULOCYTES NFR BLD: 1.8 % (ref 0–0.5)
IMM GRANULOCYTES NFR BLD: 3.9 % (ref 0–0.5)
IMM GRANULOCYTES NFR BLD: 9.8 % (ref 0–0.5)
INR PPP: 1.7 (ref 0.9–1.1)
INR PPP: 1.72 (ref 0.9–1.1)
INR PPP: 1.8 (ref 0.8–1.2)
IRON 24H UR-MRATE: 69 MCG/DL (ref 37–145)
IRON SATN MFR SERPL: 59 % (ref 20–50)
KETONES UR QL STRIP: ABNORMAL
KETONES UR QL STRIP: ABNORMAL
KETONES UR QL STRIP: NEGATIVE
LAB AP CASE REPORT: NORMAL
LEUKOCYTE ESTERASE UR QL STRIP.AUTO: ABNORMAL
LEUKOCYTE ESTERASE UR QL STRIP.AUTO: NEGATIVE
LEUKOCYTE ESTERASE UR QL STRIP.AUTO: NEGATIVE
LYMPHOCYTES # BLD AUTO: 0.46 10*3/MM3 (ref 0.9–4.8)
LYMPHOCYTES # BLD AUTO: 0.53 10*3/MM3 (ref 0.9–4.8)
LYMPHOCYTES # BLD AUTO: 0.55 10*3/MM3 (ref 1–3.5)
LYMPHOCYTES # BLD AUTO: 0.57 10*3/MM3 (ref 1–3.5)
LYMPHOCYTES # BLD AUTO: 0.59 10*3/MM3 (ref 1–3.5)
LYMPHOCYTES # BLD AUTO: 0.61 10*3/MM3 (ref 1–3.5)
LYMPHOCYTES # BLD AUTO: 0.65 10*3/MM3 (ref 0.9–4.8)
LYMPHOCYTES # BLD AUTO: 0.65 10*3/MM3 (ref 1–3.5)
LYMPHOCYTES # BLD AUTO: 0.67 10*3/MM3 (ref 0.9–4.8)
LYMPHOCYTES # BLD AUTO: 0.68 10*3/MM3 (ref 1–3.5)
LYMPHOCYTES # BLD AUTO: 0.7 10*3/MM3 (ref 1–3.5)
LYMPHOCYTES # BLD AUTO: 0.72 10*3/MM3 (ref 0.9–4.8)
LYMPHOCYTES # BLD AUTO: 0.77 10*3/MM3 (ref 0.9–4.8)
LYMPHOCYTES # BLD AUTO: 0.78 10*3/MM3 (ref 0.9–4.8)
LYMPHOCYTES # BLD AUTO: 0.79 10*3/MM3 (ref 0.9–4.8)
LYMPHOCYTES # BLD AUTO: 0.85 10*3/MM3 (ref 1–3.5)
LYMPHOCYTES # BLD AUTO: 0.89 10*3/MM3 (ref 1–3.5)
LYMPHOCYTES # BLD AUTO: 0.91 10*3/MM3 (ref 1–3.5)
LYMPHOCYTES # BLD MANUAL: 0.55 10*3/MM3 (ref 0.9–4.8)
LYMPHOCYTES NFR BLD AUTO: 10.1 % (ref 20–49)
LYMPHOCYTES NFR BLD AUTO: 11.1 % (ref 19.6–45.3)
LYMPHOCYTES NFR BLD AUTO: 13.4 % (ref 19.6–45.3)
LYMPHOCYTES NFR BLD AUTO: 5.7 % (ref 20–49)
LYMPHOCYTES NFR BLD AUTO: 6 % (ref 19.6–45.3)
LYMPHOCYTES NFR BLD AUTO: 6 % (ref 19.6–45.3)
LYMPHOCYTES NFR BLD AUTO: 6.8 % (ref 19.6–45.3)
LYMPHOCYTES NFR BLD AUTO: 7.2 % (ref 20–49)
LYMPHOCYTES NFR BLD AUTO: 7.3 % (ref 20–49)
LYMPHOCYTES NFR BLD AUTO: 7.6 % (ref 20–49)
LYMPHOCYTES NFR BLD AUTO: 8 % (ref 19.6–45.3)
LYMPHOCYTES NFR BLD AUTO: 8 % (ref 20–49)
LYMPHOCYTES NFR BLD AUTO: 8.5 % (ref 20–49)
LYMPHOCYTES NFR BLD AUTO: 8.7 % (ref 19.6–45.3)
LYMPHOCYTES NFR BLD AUTO: 8.8 % (ref 20–49)
LYMPHOCYTES NFR BLD AUTO: 9 % (ref 20–49)
LYMPHOCYTES NFR BLD AUTO: 9.1 % (ref 19.6–45.3)
LYMPHOCYTES NFR BLD AUTO: 9.9 % (ref 20–49)
LYMPHOCYTES NFR BLD MANUAL: 4 % (ref 19.6–45.3)
LYMPHOCYTES NFR BLD MANUAL: 5 % (ref 5–12)
LYMPHOCYTES NFR FLD MANUAL: 16 %
Lab: NORMAL
MACROCYTES BLD QL SMEAR: NORMAL
MAGNESIUM SERPL-MCNC: 1.5 MG/DL (ref 1.6–2.4)
MAGNESIUM SERPL-MCNC: 1.5 MG/DL (ref 1.6–2.4)
MAGNESIUM SERPL-MCNC: 1.5 MG/DL (ref 1.8–2.5)
MAGNESIUM SERPL-MCNC: 1.6 MG/DL (ref 1.6–2.4)
MAGNESIUM SERPL-MCNC: 2 MG/DL (ref 1.6–2.4)
MAGNESIUM SERPL-MCNC: 2.3 MG/DL (ref 1.6–2.4)
MCH RBC QN AUTO: 31 PG (ref 27–33)
MCH RBC QN AUTO: 31.6 PG (ref 27–33)
MCH RBC QN AUTO: 32 PG (ref 27–33)
MCH RBC QN AUTO: 32.5 PG (ref 27–33)
MCH RBC QN AUTO: 32.9 PG (ref 27–33)
MCH RBC QN AUTO: 34.1 PG (ref 27–33)
MCH RBC QN AUTO: 34.2 PG (ref 27–33)
MCH RBC QN AUTO: 34.4 PG (ref 27–33)
MCH RBC QN AUTO: 34.5 PG (ref 26.9–32)
MCH RBC QN AUTO: 34.9 PG (ref 26.9–32)
MCH RBC QN AUTO: 35 PG (ref 26.9–32)
MCH RBC QN AUTO: 35.5 PG (ref 26.9–32)
MCH RBC QN AUTO: 35.8 PG (ref 26.9–32)
MCH RBC QN AUTO: 35.9 PG (ref 27–33)
MCH RBC QN AUTO: 36.3 PG (ref 26.9–32)
MCH RBC QN AUTO: 36.3 PG (ref 26.9–32)
MCH RBC QN AUTO: 36.4 PG (ref 26.9–32)
MCH RBC QN AUTO: 36.6 PG (ref 26.9–32)
MCH RBC QN AUTO: 37.4 PG (ref 27–33)
MCHC RBC AUTO-ENTMCNC: 32.3 G/DL (ref 32–35)
MCHC RBC AUTO-ENTMCNC: 32.4 G/DL (ref 32–35)
MCHC RBC AUTO-ENTMCNC: 32.7 G/DL (ref 32–35)
MCHC RBC AUTO-ENTMCNC: 33.3 G/DL (ref 32.4–36.3)
MCHC RBC AUTO-ENTMCNC: 33.4 G/DL (ref 32–35)
MCHC RBC AUTO-ENTMCNC: 33.6 G/DL (ref 32–35)
MCHC RBC AUTO-ENTMCNC: 33.8 G/DL (ref 32.4–36.3)
MCHC RBC AUTO-ENTMCNC: 34.3 G/DL (ref 32–35)
MCHC RBC AUTO-ENTMCNC: 34.5 G/DL (ref 32–35)
MCHC RBC AUTO-ENTMCNC: 34.6 G/DL (ref 32–35)
MCHC RBC AUTO-ENTMCNC: 34.7 G/DL (ref 32.4–36.3)
MCHC RBC AUTO-ENTMCNC: 34.8 G/DL (ref 32.4–36.3)
MCHC RBC AUTO-ENTMCNC: 35 G/DL (ref 32.4–36.3)
MCHC RBC AUTO-ENTMCNC: 35.4 G/DL (ref 32.4–36.3)
MCHC RBC AUTO-ENTMCNC: 35.4 G/DL (ref 32.4–36.3)
MCHC RBC AUTO-ENTMCNC: 35.9 G/DL (ref 32.4–36.3)
MCHC RBC AUTO-ENTMCNC: 36 G/DL (ref 32.4–36.3)
MCV RBC AUTO: 100 FL (ref 80.5–98.2)
MCV RBC AUTO: 101.1 FL (ref 80.5–98.2)
MCV RBC AUTO: 102.7 FL (ref 83–97)
MCV RBC AUTO: 103.3 FL (ref 80.5–98.2)
MCV RBC AUTO: 103.5 FL (ref 80.5–98.2)
MCV RBC AUTO: 103.6 FL (ref 83–97)
MCV RBC AUTO: 104.2 FL (ref 80.5–98.2)
MCV RBC AUTO: 104.2 FL (ref 83–97)
MCV RBC AUTO: 104.8 FL (ref 80.5–98.2)
MCV RBC AUTO: 105.8 FL (ref 83–97)
MCV RBC AUTO: 107.4 FL (ref 80.5–98.2)
MCV RBC AUTO: 108.2 FL (ref 83–97)
MCV RBC AUTO: 91.5 FL (ref 83–97)
MCV RBC AUTO: 92.6 FL (ref 83–97)
MCV RBC AUTO: 95.7 FL (ref 83–97)
MCV RBC AUTO: 95.8 FL (ref 83–97)
MCV RBC AUTO: 96.8 FL (ref 83–97)
MCV RBC AUTO: 97.6 FL (ref 80.5–98.2)
MCV RBC AUTO: 98.9 FL (ref 80.5–98.2)
METAMYELOCYTES NFR BLD MANUAL: 1 % (ref 0–0)
METHOD: ABNORMAL
MONOCYTES # BLD AUTO: 0.03 10*3/MM3 (ref 0.2–1.2)
MONOCYTES # BLD AUTO: 0.07 10*3/MM3 (ref 0.2–1.2)
MONOCYTES # BLD AUTO: 0.55 10*3/MM3 (ref 0.25–0.8)
MONOCYTES # BLD AUTO: 0.65 10*3/MM3 (ref 0.2–1.2)
MONOCYTES # BLD AUTO: 0.66 10*3/MM3 (ref 0.2–1.2)
MONOCYTES # BLD AUTO: 0.68 10*3/MM3 (ref 0.25–0.8)
MONOCYTES # BLD AUTO: 0.69 10*3/MM3 (ref 0.2–1.2)
MONOCYTES # BLD AUTO: 0.7 10*3/MM3 (ref 0.2–1.2)
MONOCYTES # BLD AUTO: 0.72 10*3/MM3 (ref 0.25–0.8)
MONOCYTES # BLD AUTO: 0.77 10*3/MM3 (ref 0.2–1.2)
MONOCYTES # BLD AUTO: 0.81 10*3/MM3 (ref 0.25–0.8)
MONOCYTES # BLD AUTO: 0.81 10*3/MM3 (ref 0.2–1.2)
MONOCYTES # BLD AUTO: 0.81 10*3/MM3 (ref 0.2–1.2)
MONOCYTES # BLD AUTO: 0.83 10*3/MM3 (ref 0.25–0.8)
MONOCYTES # BLD AUTO: 0.86 10*3/MM3 (ref 0.25–0.8)
MONOCYTES # BLD AUTO: 0.93 10*3/MM3 (ref 0.25–0.8)
MONOCYTES # BLD AUTO: 0.93 10*3/MM3 (ref 0.25–0.8)
MONOCYTES # BLD AUTO: 1.13 10*3/MM3 (ref 0.25–0.8)
MONOCYTES # BLD AUTO: 1.33 10*3/MM3 (ref 0.25–0.8)
MONOCYTES NFR BLD AUTO: 0.3 % (ref 5–12)
MONOCYTES NFR BLD AUTO: 0.9 % (ref 5–12)
MONOCYTES NFR BLD AUTO: 10.4 % (ref 4–12)
MONOCYTES NFR BLD AUTO: 10.8 % (ref 4–12)
MONOCYTES NFR BLD AUTO: 11 % (ref 4–12)
MONOCYTES NFR BLD AUTO: 11.7 % (ref 4–12)
MONOCYTES NFR BLD AUTO: 12.9 % (ref 4–12)
MONOCYTES NFR BLD AUTO: 13.4 % (ref 5–12)
MONOCYTES NFR BLD AUTO: 13.5 % (ref 4–12)
MONOCYTES NFR BLD AUTO: 6 % (ref 4–12)
MONOCYTES NFR BLD AUTO: 7.1 % (ref 4–12)
MONOCYTES NFR BLD AUTO: 8.2 % (ref 5–12)
MONOCYTES NFR BLD AUTO: 8.3 % (ref 5–12)
MONOCYTES NFR BLD AUTO: 8.5 % (ref 5–12)
MONOCYTES NFR BLD AUTO: 8.6 % (ref 5–12)
MONOCYTES NFR BLD AUTO: 9.8 % (ref 4–12)
MONOCYTES NFR BLD AUTO: 9.9 % (ref 4–12)
MONOCYTES NFR BLD AUTO: 9.9 % (ref 5–12)
MONOCYTES NFR FLD: 21 %
MONOS+MACROS NFR FLD: 59 %
MYELOCYTES NFR BLD MANUAL: 2 % (ref 0–0)
NEUTROPHILS # BLD AUTO: 12.01 10*3/MM3 (ref 1.9–8.1)
NEUTROPHILS # BLD AUTO: 13.62 10*3/MM3 (ref 1.5–7)
NEUTROPHILS # BLD AUTO: 3.96 10*3/MM3 (ref 1.9–8.1)
NEUTROPHILS # BLD AUTO: 4.78 10*3/MM3 (ref 1.5–7)
NEUTROPHILS # BLD AUTO: 4.94 10*3/MM3 (ref 1.5–7)
NEUTROPHILS # BLD AUTO: 5.33 10*3/MM3 (ref 1.9–8.1)
NEUTROPHILS # BLD AUTO: 5.49 10*3/MM3 (ref 1.5–7)
NEUTROPHILS # BLD AUTO: 5.75 10*3/MM3 (ref 1.5–7)
NEUTROPHILS # BLD AUTO: 5.8 10*3/MM3 (ref 1.5–7)
NEUTROPHILS # BLD AUTO: 6.12 10*3/MM3 (ref 1.9–8.1)
NEUTROPHILS # BLD AUTO: 6.22 10*3/MM3 (ref 1.5–7)
NEUTROPHILS # BLD AUTO: 6.22 10*3/MM3 (ref 1.9–8.1)
NEUTROPHILS # BLD AUTO: 6.43 10*3/MM3 (ref 1.5–7)
NEUTROPHILS # BLD AUTO: 7.03 10*3/MM3 (ref 1.9–8.1)
NEUTROPHILS # BLD AUTO: 7.81 10*3/MM3 (ref 1.5–7)
NEUTROPHILS # BLD AUTO: 7.82 10*3/MM3 (ref 1.9–8.1)
NEUTROPHILS # BLD AUTO: 7.92 10*3/MM3 (ref 1.9–8.1)
NEUTROPHILS # BLD AUTO: 8.16 10*3/MM3 (ref 1.9–8.1)
NEUTROPHILS # BLD AUTO: 8.77 10*3/MM3 (ref 1.5–7)
NEUTROPHILS NFR BLD AUTO: 68.7 % (ref 42.7–76)
NEUTROPHILS NFR BLD AUTO: 71.5 % (ref 39–75)
NEUTROPHILS NFR BLD AUTO: 71.5 % (ref 39–75)
NEUTROPHILS NFR BLD AUTO: 74.2 % (ref 39–75)
NEUTROPHILS NFR BLD AUTO: 75.1 % (ref 42.7–76)
NEUTROPHILS NFR BLD AUTO: 76.2 % (ref 39–75)
NEUTROPHILS NFR BLD AUTO: 77.2 % (ref 42.7–76)
NEUTROPHILS NFR BLD AUTO: 78.4 % (ref 39–75)
NEUTROPHILS NFR BLD AUTO: 78.7 % (ref 39–75)
NEUTROPHILS NFR BLD AUTO: 79.5 % (ref 39–75)
NEUTROPHILS NFR BLD AUTO: 80.2 % (ref 39–75)
NEUTROPHILS NFR BLD AUTO: 80.2 % (ref 42.7–76)
NEUTROPHILS NFR BLD AUTO: 80.8 % (ref 42.7–76)
NEUTROPHILS NFR BLD AUTO: 83.2 % (ref 42.7–76)
NEUTROPHILS NFR BLD AUTO: 84.6 % (ref 39–75)
NEUTROPHILS NFR BLD AUTO: 85 % (ref 39–75)
NEUTROPHILS NFR BLD AUTO: 92.3 % (ref 42.7–76)
NEUTROPHILS NFR BLD AUTO: 92.4 % (ref 42.7–76)
NEUTROPHILS NFR BLD MANUAL: 87 % (ref 42.7–76)
NEUTROPHILS NFR FLD MANUAL: 4 %
NITRITE UR QL STRIP: NEGATIVE
NITRITE UR QL STRIP: NEGATIVE
NITRITE UR QL STRIP: POSITIVE
NRBC BLD MANUAL-RTO: 0 /100 WBC (ref 0–0)
NRBC BLD MANUAL-RTO: 0.2 /100 WBC (ref 0–0)
NT-PROBNP SERPL-MCNC: 8641 PG/ML (ref 0–1800)
NUC CELL # FLD: 127 /MM3
OVALOCYTES BLD QL SMEAR: ABNORMAL
PATH REPORT.FINAL DX SPEC: NORMAL
PATH REPORT.GROSS SPEC: NORMAL
PH UR STRIP.AUTO: <=5 [PH] (ref 4.5–8)
PH UR STRIP.AUTO: <=5 [PH] (ref 5–8)
PH UR STRIP.AUTO: <=5 [PH] (ref 5–8)
PHOSPHATE SERPL-MCNC: 5 MG/DL (ref 2.5–4.5)
PLAT MORPH BLD: NORMAL
PLAT MORPH BLD: NORMAL
PLATELET # BLD AUTO: 100 10*3/MM3 (ref 140–500)
PLATELET # BLD AUTO: 101 10*3/MM3 (ref 140–500)
PLATELET # BLD AUTO: 105 10*3/MM3 (ref 140–500)
PLATELET # BLD AUTO: 107 10*3/MM3 (ref 150–375)
PLATELET # BLD AUTO: 123 10*3/MM3 (ref 140–500)
PLATELET # BLD AUTO: 126 10*3/MM3 (ref 140–500)
PLATELET # BLD AUTO: 139 10*3/MM3 (ref 150–375)
PLATELET # BLD AUTO: 140 10*3/MM3 (ref 150–375)
PLATELET # BLD AUTO: 165 10*3/MM3 (ref 150–375)
PLATELET # BLD AUTO: 188 10*3/MM3 (ref 150–375)
PLATELET # BLD AUTO: 198 10*3/MM3 (ref 150–375)
PLATELET # BLD AUTO: 252 10*3/MM3 (ref 150–375)
PLATELET # BLD AUTO: 66 10*3/MM3 (ref 140–500)
PLATELET # BLD AUTO: 73 10*3/MM3 (ref 150–375)
PLATELET # BLD AUTO: 75 10*3/MM3 (ref 140–500)
PLATELET # BLD AUTO: 79 10*3/MM3 (ref 140–500)
PLATELET # BLD AUTO: 85 10*3/MM3 (ref 150–375)
PLATELET # BLD AUTO: 87 10*3/MM3 (ref 150–375)
PLATELET # BLD AUTO: 96 10*3/MM3 (ref 140–500)
PMV BLD AUTO: 10 FL (ref 8.9–12.1)
PMV BLD AUTO: 10.1 FL (ref 8.9–12.1)
PMV BLD AUTO: 10.3 FL (ref 6–12)
PMV BLD AUTO: 10.3 FL (ref 8.9–12.1)
PMV BLD AUTO: 10.5 FL (ref 8.9–12.1)
PMV BLD AUTO: 10.5 FL (ref 8.9–12.1)
PMV BLD AUTO: 10.6 FL (ref 6–12)
PMV BLD AUTO: 10.6 FL (ref 8.9–12.1)
PMV BLD AUTO: 10.7 FL (ref 6–12)
PMV BLD AUTO: 10.7 FL (ref 8.9–12.1)
PMV BLD AUTO: 10.8 FL (ref 6–12)
PMV BLD AUTO: 11 FL (ref 6–12)
PMV BLD AUTO: 11.1 FL (ref 6–12)
PMV BLD AUTO: 11.1 FL (ref 6–12)
PMV BLD AUTO: 11.4 FL (ref 6–12)
PMV BLD AUTO: 11.4 FL (ref 6–12)
PMV BLD AUTO: 9.7 FL (ref 8.9–12.1)
POTASSIUM BLD-SCNC: 3.7 MMOL/L (ref 3.5–5.2)
POTASSIUM BLD-SCNC: 3.9 MMOL/L (ref 3.5–5.2)
POTASSIUM BLD-SCNC: 4 MMOL/L (ref 3.5–5.2)
POTASSIUM BLD-SCNC: 4.1 MMOL/L (ref 3.5–4.7)
POTASSIUM BLD-SCNC: 4.1 MMOL/L (ref 3.5–5.2)
POTASSIUM BLD-SCNC: 4.2 MMOL/L (ref 3.5–4.7)
POTASSIUM BLD-SCNC: 4.2 MMOL/L (ref 3.5–4.7)
POTASSIUM BLD-SCNC: 4.3 MMOL/L (ref 3.5–4.7)
POTASSIUM BLD-SCNC: 4.3 MMOL/L (ref 3.5–4.7)
POTASSIUM BLD-SCNC: 4.3 MMOL/L (ref 3.5–5.2)
POTASSIUM BLD-SCNC: 4.3 MMOL/L (ref 3.5–5.2)
POTASSIUM BLD-SCNC: 4.5 MMOL/L (ref 3.5–4.7)
POTASSIUM BLD-SCNC: 4.6 MMOL/L (ref 3.5–4.7)
POTASSIUM BLD-SCNC: 4.6 MMOL/L (ref 3.5–4.7)
POTASSIUM BLD-SCNC: 4.9 MMOL/L (ref 3.5–4.7)
POTASSIUM BLD-SCNC: 5.3 MMOL/L (ref 3.5–5.2)
POTASSIUM BLD-SCNC: 5.5 MMOL/L (ref 3.5–5.2)
POTASSIUM BLD-SCNC: 5.7 MMOL/L (ref 3.5–5.2)
POTASSIUM BLD-SCNC: 6.3 MMOL/L (ref 3.5–5.2)
PROCALCITONIN SERPL-MCNC: 3.4 NG/ML (ref 0.1–0.25)
PROT SERPL-MCNC: 5.2 G/DL (ref 6–8.5)
PROT SERPL-MCNC: 5.3 G/DL (ref 6–8.5)
PROT SERPL-MCNC: 5.4 G/DL (ref 6–8.5)
PROT SERPL-MCNC: 5.5 G/DL (ref 6–8.5)
PROT SERPL-MCNC: 5.6 G/DL (ref 6.3–8)
PROT SERPL-MCNC: 5.7 G/DL (ref 6.3–8)
PROT SERPL-MCNC: 5.7 G/DL (ref 6–8.5)
PROT SERPL-MCNC: 6.2 G/DL (ref 6.3–8)
PROT SERPL-MCNC: 6.2 G/DL (ref 6.3–8)
PROT SERPL-MCNC: 6.4 G/DL (ref 6.3–8)
PROT SERPL-MCNC: 6.4 G/DL (ref 6.3–8)
PROT SERPL-MCNC: 6.5 G/DL (ref 6.3–8)
PROT SERPL-MCNC: 6.8 G/DL (ref 6–8.5)
PROT SERPL-MCNC: 6.8 G/DL (ref 6–8.5)
PROT SERPL-MCNC: 7.1 G/DL (ref 6.3–8)
PROT SERPL-MCNC: 7.1 G/DL (ref 6.3–8)
PROT UR QL STRIP: ABNORMAL
PROT UR QL STRIP: NEGATIVE
PROT UR QL STRIP: NEGATIVE
PROT UR-MCNC: 60 MG/DL
PROT/CREAT UR: 518.6 MG/G CREA
PROTHROMBIN TIME: 19.4 SECONDS (ref 11.7–14.2)
PROTHROMBIN TIME: 19.6 SECONDS (ref 11.7–14.2)
PROTHROMBIN TIME: 20.6 SECONDS (ref 12.8–15.2)
RBC # BLD AUTO: 1.76 10*6/MM3 (ref 3.9–5.2)
RBC # BLD AUTO: 2.29 10*6/MM3 (ref 3.9–5.2)
RBC # BLD AUTO: 2.34 10*6/MM3 (ref 3.9–5.2)
RBC # BLD AUTO: 2.43 10*6/MM3 (ref 3.9–5.2)
RBC # BLD AUTO: 2.51 10*6/MM3 (ref 3.9–5)
RBC # BLD AUTO: 2.54 10*6/MM3 (ref 3.9–5.2)
RBC # BLD AUTO: 2.57 10*6/MM3 (ref 3.9–5)
RBC # BLD AUTO: 2.61 10*6/MM3 (ref 3.9–5)
RBC # BLD AUTO: 2.62 10*6/MM3 (ref 3.9–5.2)
RBC # BLD AUTO: 2.79 10*6/MM3 (ref 3.9–5.2)
RBC # BLD AUTO: 2.8 10*6/MM3 (ref 3.9–5)
RBC # BLD AUTO: 2.83 10*6/MM3 (ref 3.9–5)
RBC # BLD AUTO: 2.87 10*6/MM3 (ref 3.9–5.2)
RBC # BLD AUTO: 2.91 10*6/MM3 (ref 3.9–5)
RBC # BLD AUTO: 2.92 10*6/MM3 (ref 3.9–5)
RBC # BLD AUTO: 3.11 10*6/MM3 (ref 3.9–5.2)
RBC # BLD AUTO: 3.13 10*6/MM3 (ref 3.9–5)
RBC # BLD AUTO: 3.16 10*6/MM3 (ref 3.9–5)
RBC # BLD AUTO: 3.5 10*6/MM3 (ref 3.9–5)
RBC # FLD AUTO: 418 /MM3
RBC # UR: ABNORMAL /HPF
RBC # UR: ABNORMAL /HPF
REF LAB TEST METHOD: ABNORMAL
REF LAB TEST METHOD: ABNORMAL
RH BLD: POSITIVE
SCAN SLIDE: NORMAL
SODIUM BLD-SCNC: 129 MMOL/L (ref 136–145)
SODIUM BLD-SCNC: 130 MMOL/L (ref 134–145)
SODIUM BLD-SCNC: 131 MMOL/L (ref 134–145)
SODIUM BLD-SCNC: 132 MMOL/L (ref 134–145)
SODIUM BLD-SCNC: 132 MMOL/L (ref 134–145)
SODIUM BLD-SCNC: 132 MMOL/L (ref 136–145)
SODIUM BLD-SCNC: 133 MMOL/L (ref 134–145)
SODIUM BLD-SCNC: 133 MMOL/L (ref 134–145)
SODIUM BLD-SCNC: 133 MMOL/L (ref 136–145)
SODIUM BLD-SCNC: 134 MMOL/L (ref 134–145)
SODIUM BLD-SCNC: 134 MMOL/L (ref 136–145)
SODIUM BLD-SCNC: 134 MMOL/L (ref 136–145)
SODIUM BLD-SCNC: 136 MMOL/L (ref 136–145)
SODIUM UR-SCNC: <20 MMOL/L
SP GR UR STRIP: 1.01 (ref 1–1.03)
SP GR UR STRIP: 1.02 (ref 1–1.03)
SP GR UR STRIP: 1.02 (ref 1–1.03)
SQUAMOUS #/AREA URNS HPF: ABNORMAL /HPF
SQUAMOUS #/AREA URNS HPF: ABNORMAL /HPF
T3FREE SERPL-MCNC: 1.49 PG/ML (ref 2–4.4)
T4 FREE SERPL-MCNC: 0.98 NG/DL (ref 0.93–1.7)
TIBC SERPL-MCNC: 116 MCG/DL (ref 298–536)
TOXIC GRANULATION: ABNORMAL
TOXIC GRANULATION: NORMAL
TRANS CELLS #/AREA URNS HPF: ABNORMAL /HPF
TRANSFERRIN SERPL-MCNC: 78 MG/DL (ref 200–360)
TROPONIN T SERPL-MCNC: <0.01 NG/ML (ref 0–0.03)
TSH SERPL DL<=0.05 MIU/L-ACNC: 6.87 MIU/ML (ref 0.27–4.2)
UNIT  ABO: NORMAL
UNIT  ABO: NORMAL
UNIT  RH: NORMAL
UNIT  RH: NORMAL
UROBILINOGEN UR QL STRIP: ABNORMAL
VIT B12 BLD-MCNC: 1503 PG/ML (ref 211–946)
WBC NRBC COR # BLD: 12.28 10*3/MM3 (ref 4–10)
WBC NRBC COR # BLD: 13.8 10*3/MM3 (ref 4.5–10.7)
WBC NRBC COR # BLD: 16.02 10*3/MM3 (ref 4–10)
WBC NRBC COR # BLD: 5.76 10*3/MM3 (ref 4.5–10.7)
WBC NRBC COR # BLD: 6.45 10*3/MM3 (ref 4–10)
WBC NRBC COR # BLD: 6.9 10*3/MM3 (ref 4–10)
WBC NRBC COR # BLD: 6.97 10*3/MM3 (ref 4–10)
WBC NRBC COR # BLD: 7.1 10*3/MM3 (ref 4.5–10.7)
WBC NRBC COR # BLD: 7.24 10*3/MM3 (ref 4–10)
WBC NRBC COR # BLD: 7.34 10*3/MM3 (ref 4–10)
WBC NRBC COR # BLD: 7.62 10*3/MM3 (ref 4.5–10.7)
WBC NRBC COR # BLD: 7.7 10*3/MM3 (ref 4.5–10.7)
WBC NRBC COR # BLD: 7.82 10*3/MM3 (ref 4–10)
WBC NRBC COR # BLD: 7.92 10*3/MM3 (ref 4.5–10.7)
WBC NRBC COR # BLD: 8.44 10*3/MM3 (ref 4–10)
WBC NRBC COR # BLD: 8.84 10*3/MM3 (ref 4.5–10.7)
WBC NRBC COR # BLD: 9.22 10*3/MM3 (ref 4–10)
WBC NRBC COR # BLD: 9.53 10*3/MM3 (ref 4.5–10.7)
WBC NRBC COR # BLD: 9.76 10*3/MM3 (ref 4.5–10.7)
WBC UR QL AUTO: ABNORMAL /HPF
WBC UR QL AUTO: ABNORMAL /HPF
WHOLE BLOOD HOLD SPECIMEN: NORMAL

## 2017-01-01 PROCEDURE — 85025 COMPLETE CBC W/AUTO DIFF WBC: CPT | Performed by: INTERNAL MEDICINE

## 2017-01-01 PROCEDURE — 80053 COMPREHEN METABOLIC PANEL: CPT

## 2017-01-01 PROCEDURE — 83735 ASSAY OF MAGNESIUM: CPT | Performed by: EMERGENCY MEDICINE

## 2017-01-01 PROCEDURE — 99222 1ST HOSP IP/OBS MODERATE 55: CPT | Performed by: INTERNAL MEDICINE

## 2017-01-01 PROCEDURE — 87150 DNA/RNA AMPLIFIED PROBE: CPT | Performed by: EMERGENCY MEDICINE

## 2017-01-01 PROCEDURE — 25010000002 VANCOMYCIN: Performed by: EMERGENCY MEDICINE

## 2017-01-01 PROCEDURE — 87086 URINE CULTURE/COLONY COUNT: CPT | Performed by: INTERNAL MEDICINE

## 2017-01-01 PROCEDURE — 84443 ASSAY THYROID STIM HORMONE: CPT | Performed by: INTERNAL MEDICINE

## 2017-01-01 PROCEDURE — 80053 COMPREHEN METABOLIC PANEL: CPT | Performed by: INTERNAL MEDICINE

## 2017-01-01 PROCEDURE — C1769 GUIDE WIRE: HCPCS | Performed by: INTERNAL MEDICINE

## 2017-01-01 PROCEDURE — 25010000002 CALCIUM GLUCONATE: Performed by: EMERGENCY MEDICINE

## 2017-01-01 PROCEDURE — 86923 COMPATIBILITY TEST ELECTRIC: CPT

## 2017-01-01 PROCEDURE — 84100 ASSAY OF PHOSPHORUS: CPT | Performed by: EMERGENCY MEDICINE

## 2017-01-01 PROCEDURE — 93005 ELECTROCARDIOGRAM TRACING: CPT | Performed by: INTERNAL MEDICINE

## 2017-01-01 PROCEDURE — 86900 BLOOD TYPING SEROLOGIC ABO: CPT

## 2017-01-01 PROCEDURE — 99285 EMERGENCY DEPT VISIT HI MDM: CPT

## 2017-01-01 PROCEDURE — 82728 ASSAY OF FERRITIN: CPT | Performed by: INTERNAL MEDICINE

## 2017-01-01 PROCEDURE — 96375 TX/PRO/DX INJ NEW DRUG ADDON: CPT | Performed by: INTERNAL MEDICINE

## 2017-01-01 PROCEDURE — 82962 GLUCOSE BLOOD TEST: CPT

## 2017-01-01 PROCEDURE — 36415 COLL VENOUS BLD VENIPUNCTURE: CPT | Performed by: INTERNAL MEDICINE

## 2017-01-01 PROCEDURE — 25010000002 LEVOFLOXACIN PER 250 MG: Performed by: INTERNAL MEDICINE

## 2017-01-01 PROCEDURE — 25010000002 MAGNESIUM SULFATE IN D5W 1G/100ML (PREMIX) 10-5 MG/ML-% SOLUTION: Performed by: INTERNAL MEDICINE

## 2017-01-01 PROCEDURE — 93010 ELECTROCARDIOGRAM REPORT: CPT | Performed by: INTERNAL MEDICINE

## 2017-01-01 PROCEDURE — 83605 ASSAY OF LACTIC ACID: CPT | Performed by: EMERGENCY MEDICINE

## 2017-01-01 PROCEDURE — 87015 SPECIMEN INFECT AGNT CONCNTJ: CPT | Performed by: INTERNAL MEDICINE

## 2017-01-01 PROCEDURE — 85610 PROTHROMBIN TIME: CPT | Performed by: INTERNAL MEDICINE

## 2017-01-01 PROCEDURE — 25010000002 FENTANYL CITRATE (PF) 100 MCG/2ML SOLUTION: Performed by: INTERNAL MEDICINE

## 2017-01-01 PROCEDURE — 25010000002 THIAMINE PER 100 MG: Performed by: INTERNAL MEDICINE

## 2017-01-01 PROCEDURE — 71020 HC CHEST PA AND LATERAL: CPT

## 2017-01-01 PROCEDURE — 99213 OFFICE O/P EST LOW 20 MIN: CPT | Performed by: INTERNAL MEDICINE

## 2017-01-01 PROCEDURE — 25010000002 MORPHINE PER 10 MG: Performed by: INTERNAL MEDICINE

## 2017-01-01 PROCEDURE — 25010000002 LORAZEPAM PER 2 MG: Performed by: INTERNAL MEDICINE

## 2017-01-01 PROCEDURE — 86900 BLOOD TYPING SEROLOGIC ABO: CPT | Performed by: INTERNAL MEDICINE

## 2017-01-01 PROCEDURE — 25010000002 ALBUMIN HUMAN 25% PER 50 ML: Performed by: INTERNAL MEDICINE

## 2017-01-01 PROCEDURE — 25010000002 ENOXAPARIN PER 10 MG: Performed by: INTERNAL MEDICINE

## 2017-01-01 PROCEDURE — 99214 OFFICE O/P EST MOD 30 MIN: CPT | Performed by: NURSE PRACTITIONER

## 2017-01-01 PROCEDURE — P9016 RBC LEUKOCYTES REDUCED: HCPCS

## 2017-01-01 PROCEDURE — 81001 URINALYSIS AUTO W/SCOPE: CPT | Performed by: EMERGENCY MEDICINE

## 2017-01-01 PROCEDURE — 25010000002 HEPARIN FLUSH (PORCINE) 100 UNIT/ML SOLUTION

## 2017-01-01 PROCEDURE — 84481 FREE ASSAY (FT-3): CPT | Performed by: INTERNAL MEDICINE

## 2017-01-01 PROCEDURE — 85007 BL SMEAR W/DIFF WBC COUNT: CPT | Performed by: EMERGENCY MEDICINE

## 2017-01-01 PROCEDURE — 25010000002 DEXAMETHASONE SODIUM PHOSPHATE 10 MG/ML SOLUTION 1 ML VIAL: Performed by: INTERNAL MEDICINE

## 2017-01-01 PROCEDURE — 99214 OFFICE O/P EST MOD 30 MIN: CPT | Performed by: INTERNAL MEDICINE

## 2017-01-01 PROCEDURE — 74328 X-RAY BILE DUCT ENDOSCOPY: CPT

## 2017-01-01 PROCEDURE — 99238 HOSP IP/OBS DSCHRG MGMT 30/<: CPT | Performed by: INTERNAL MEDICINE

## 2017-01-01 PROCEDURE — 63710000001 INSULIN REGULAR HUMAN PER 5 UNITS: Performed by: EMERGENCY MEDICINE

## 2017-01-01 PROCEDURE — 86301 IMMUNOASSAY TUMOR CA 19-9: CPT | Performed by: INTERNAL MEDICINE

## 2017-01-01 PROCEDURE — 25010000002 HYDROCORTISONE SODIUM SUCCINATE 100 MG RECONSTITUTED SOLUTION: Performed by: INTERNAL MEDICINE

## 2017-01-01 PROCEDURE — 25010000003 CEFTRIAXONE PER 250 MG: Performed by: PHYSICIAN ASSISTANT

## 2017-01-01 PROCEDURE — 86901 BLOOD TYPING SEROLOGIC RH(D): CPT | Performed by: INTERNAL MEDICINE

## 2017-01-01 PROCEDURE — 83735 ASSAY OF MAGNESIUM: CPT | Performed by: NURSE PRACTITIONER

## 2017-01-01 PROCEDURE — 36416 COLLJ CAPILLARY BLOOD SPEC: CPT | Performed by: INTERNAL MEDICINE

## 2017-01-01 PROCEDURE — 25010000002 FENTANYL CITRATE (PF) 100 MCG/2ML SOLUTION

## 2017-01-01 PROCEDURE — 82042 OTHER SOURCE ALBUMIN QUAN EA: CPT | Performed by: INTERNAL MEDICINE

## 2017-01-01 PROCEDURE — 25010000002 DEXAMETHASONE PER 1 MG: Performed by: INTERNAL MEDICINE

## 2017-01-01 PROCEDURE — 81003 URINALYSIS AUTO W/O SCOPE: CPT

## 2017-01-01 PROCEDURE — 99239 HOSP IP/OBS DSCHRG MGMT >30: CPT | Performed by: INTERNAL MEDICINE

## 2017-01-01 PROCEDURE — 25010000002 PROPOFOL 10 MG/ML EMULSION: Performed by: ANESTHESIOLOGY

## 2017-01-01 PROCEDURE — 85025 COMPLETE CBC W/AUTO DIFF WBC: CPT

## 2017-01-01 PROCEDURE — 96367 TX/PROPH/DG ADDL SEQ IV INF: CPT | Performed by: INTERNAL MEDICINE

## 2017-01-01 PROCEDURE — 25010000002 PROCHLORPERAZINE EDISYLATE PER 10 MG: Performed by: INTERNAL MEDICINE

## 2017-01-01 PROCEDURE — 96413 CHEMO IV INFUSION 1 HR: CPT | Performed by: INTERNAL MEDICINE

## 2017-01-01 PROCEDURE — 84145 PROCALCITONIN (PCT): CPT | Performed by: INTERNAL MEDICINE

## 2017-01-01 PROCEDURE — 87040 BLOOD CULTURE FOR BACTERIA: CPT | Performed by: EMERGENCY MEDICINE

## 2017-01-01 PROCEDURE — 25010000002 ONDANSETRON PER 1 MG: Performed by: INTERNAL MEDICINE

## 2017-01-01 PROCEDURE — 36430 TRANSFUSION BLD/BLD COMPNT: CPT

## 2017-01-01 PROCEDURE — 25010000002 GEMCITABINE 200 MG/5.26ML SOLUTION 26.3 ML VIAL: Performed by: INTERNAL MEDICINE

## 2017-01-01 PROCEDURE — 87086 URINE CULTURE/COLONY COUNT: CPT | Performed by: EMERGENCY MEDICINE

## 2017-01-01 PROCEDURE — 99233 SBSQ HOSP IP/OBS HIGH 50: CPT | Performed by: INTERNAL MEDICINE

## 2017-01-01 PROCEDURE — 99232 SBSQ HOSP IP/OBS MODERATE 35: CPT | Performed by: INTERNAL MEDICINE

## 2017-01-01 PROCEDURE — 36415 COLL VENOUS BLD VENIPUNCTURE: CPT

## 2017-01-01 PROCEDURE — 89051 BODY FLUID CELL COUNT: CPT | Performed by: INTERNAL MEDICINE

## 2017-01-01 PROCEDURE — 84484 ASSAY OF TROPONIN QUANT: CPT | Performed by: EMERGENCY MEDICINE

## 2017-01-01 PROCEDURE — 25010000002 HEPARIN FLUSH (PORCINE) 100 UNIT/ML SOLUTION: Performed by: INTERNAL MEDICINE

## 2017-01-01 PROCEDURE — 36416 COLLJ CAPILLARY BLOOD SPEC: CPT

## 2017-01-01 PROCEDURE — 71010 HC CHEST PA OR AP: CPT

## 2017-01-01 PROCEDURE — 84484 ASSAY OF TROPONIN QUANT: CPT | Performed by: INTERNAL MEDICINE

## 2017-01-01 PROCEDURE — 71250 CT THORAX DX C-: CPT

## 2017-01-01 PROCEDURE — 74330 X-RAY BILE/PANC ENDOSCOPY: CPT

## 2017-01-01 PROCEDURE — 93005 ELECTROCARDIOGRAM TRACING: CPT

## 2017-01-01 PROCEDURE — 74176 CT ABD & PELVIS W/O CONTRAST: CPT

## 2017-01-01 PROCEDURE — 96367 TX/PROPH/DG ADDL SEQ IV INF: CPT | Performed by: NURSE PRACTITIONER

## 2017-01-01 PROCEDURE — 25010000002 GEMCITABINE 200 MG/5.26ML SOLUTION 5.26 ML VIAL: Performed by: INTERNAL MEDICINE

## 2017-01-01 PROCEDURE — 84439 ASSAY OF FREE THYROXINE: CPT | Performed by: INTERNAL MEDICINE

## 2017-01-01 PROCEDURE — 83735 ASSAY OF MAGNESIUM: CPT

## 2017-01-01 PROCEDURE — 0FC98ZZ EXTIRPATION OF MATTER FROM COMMON BILE DUCT, VIA NATURAL OR ARTIFICIAL OPENING ENDOSCOPIC: ICD-10-PCS | Performed by: OPHTHALMOLOGY

## 2017-01-01 PROCEDURE — 82550 ASSAY OF CK (CPK): CPT | Performed by: EMERGENCY MEDICINE

## 2017-01-01 PROCEDURE — 80053 COMPREHEN METABOLIC PANEL: CPT | Performed by: NURSE PRACTITIONER

## 2017-01-01 PROCEDURE — 63710000001 DIPHENHYDRAMINE PER 50 MG: Performed by: INTERNAL MEDICINE

## 2017-01-01 PROCEDURE — 25010000002 MAGNESIUM SULFATE IN 0.9% NS 2 GM/100ML SOLUTION: Performed by: NURSE PRACTITIONER

## 2017-01-01 PROCEDURE — P9046 ALBUMIN (HUMAN), 25%, 20 ML: HCPCS | Performed by: INTERNAL MEDICINE

## 2017-01-01 PROCEDURE — 43264 ERCP REMOVE DUCT CALCULI: CPT | Performed by: INTERNAL MEDICINE

## 2017-01-01 PROCEDURE — 83540 ASSAY OF IRON: CPT | Performed by: INTERNAL MEDICINE

## 2017-01-01 PROCEDURE — 25010000002 MAGNESIUM SULFATE 2 GM/50ML SOLUTION: Performed by: INTERNAL MEDICINE

## 2017-01-01 PROCEDURE — 80053 COMPREHEN METABOLIC PANEL: CPT | Performed by: EMERGENCY MEDICINE

## 2017-01-01 PROCEDURE — 99215 OFFICE O/P EST HI 40 MIN: CPT | Performed by: NURSE PRACTITIONER

## 2017-01-01 PROCEDURE — 0 GADOBENATE DIMEGLUMINE 529 MG/ML SOLUTION: Performed by: INTERNAL MEDICINE

## 2017-01-01 PROCEDURE — 82746 ASSAY OF FOLIC ACID SERUM: CPT | Performed by: INTERNAL MEDICINE

## 2017-01-01 PROCEDURE — 85610 PROTHROMBIN TIME: CPT | Performed by: EMERGENCY MEDICINE

## 2017-01-01 PROCEDURE — 74183 MRI ABD W/O CNTR FLWD CNTR: CPT

## 2017-01-01 PROCEDURE — 0 IOPAMIDOL 61 % SOLUTION: Performed by: INTERNAL MEDICINE

## 2017-01-01 PROCEDURE — 87186 SC STD MICRODIL/AGAR DIL: CPT | Performed by: EMERGENCY MEDICINE

## 2017-01-01 PROCEDURE — 87070 CULTURE OTHR SPECIMN AEROBIC: CPT | Performed by: INTERNAL MEDICINE

## 2017-01-01 PROCEDURE — 83735 ASSAY OF MAGNESIUM: CPT | Performed by: INTERNAL MEDICINE

## 2017-01-01 PROCEDURE — 82607 VITAMIN B-12: CPT | Performed by: INTERNAL MEDICINE

## 2017-01-01 PROCEDURE — 82009 KETONE BODYS QUAL: CPT | Performed by: EMERGENCY MEDICINE

## 2017-01-01 PROCEDURE — 84156 ASSAY OF PROTEIN URINE: CPT | Performed by: INTERNAL MEDICINE

## 2017-01-01 PROCEDURE — 99223 1ST HOSP IP/OBS HIGH 75: CPT | Performed by: NURSE PRACTITIONER

## 2017-01-01 PROCEDURE — 36591 DRAW BLOOD OFF VENOUS DEVICE: CPT

## 2017-01-01 PROCEDURE — 84300 ASSAY OF URINE SODIUM: CPT | Performed by: INTERNAL MEDICINE

## 2017-01-01 PROCEDURE — 86850 RBC ANTIBODY SCREEN: CPT | Performed by: INTERNAL MEDICINE

## 2017-01-01 PROCEDURE — 96413 CHEMO IV INFUSION 1 HR: CPT | Performed by: NURSE PRACTITIONER

## 2017-01-01 PROCEDURE — 76942 ECHO GUIDE FOR BIOPSY: CPT

## 2017-01-01 PROCEDURE — 96375 TX/PRO/DX INJ NEW DRUG ADDON: CPT | Performed by: NURSE PRACTITIONER

## 2017-01-01 PROCEDURE — 85025 COMPLETE CBC W/AUTO DIFF WBC: CPT | Performed by: EMERGENCY MEDICINE

## 2017-01-01 PROCEDURE — 83880 ASSAY OF NATRIURETIC PEPTIDE: CPT | Performed by: INTERNAL MEDICINE

## 2017-01-01 PROCEDURE — 84466 ASSAY OF TRANSFERRIN: CPT | Performed by: INTERNAL MEDICINE

## 2017-01-01 PROCEDURE — 25010000002 HEPARIN (PORCINE) PER 1000 UNITS: Performed by: INTERNAL MEDICINE

## 2017-01-01 PROCEDURE — 85025 COMPLETE CBC W/AUTO DIFF WBC: CPT | Performed by: NURSE PRACTITIONER

## 2017-01-01 PROCEDURE — 82436 ASSAY OF URINE CHLORIDE: CPT | Performed by: INTERNAL MEDICINE

## 2017-01-01 PROCEDURE — 85730 THROMBOPLASTIN TIME PARTIAL: CPT | Performed by: EMERGENCY MEDICINE

## 2017-01-01 PROCEDURE — 99212-NC PR NO CHARGE CBC OFFICE OUTPATIENT VISIT 10 MINUTES: Performed by: INTERNAL MEDICINE

## 2017-01-01 PROCEDURE — 0W9G3ZZ DRAINAGE OF PERITONEAL CAVITY, PERCUTANEOUS APPROACH: ICD-10-PCS | Performed by: RADIOLOGY

## 2017-01-01 PROCEDURE — 88305 TISSUE EXAM BY PATHOLOGIST: CPT | Performed by: INTERNAL MEDICINE

## 2017-01-01 PROCEDURE — 96523 IRRIG DRUG DELIVERY DEVICE: CPT

## 2017-01-01 PROCEDURE — 83605 ASSAY OF LACTIC ACID: CPT | Performed by: INTERNAL MEDICINE

## 2017-01-01 PROCEDURE — 87205 SMEAR GRAM STAIN: CPT | Performed by: INTERNAL MEDICINE

## 2017-01-01 PROCEDURE — 36415 COLL VENOUS BLD VENIPUNCTURE: CPT | Performed by: EMERGENCY MEDICINE

## 2017-01-01 PROCEDURE — 82570 ASSAY OF URINE CREATININE: CPT | Performed by: INTERNAL MEDICINE

## 2017-01-01 PROCEDURE — 93005 ELECTROCARDIOGRAM TRACING: CPT | Performed by: EMERGENCY MEDICINE

## 2017-01-01 PROCEDURE — A9577 INJ MULTIHANCE: HCPCS | Performed by: INTERNAL MEDICINE

## 2017-01-01 PROCEDURE — 88112 CYTOPATH CELL ENHANCE TECH: CPT | Performed by: INTERNAL MEDICINE

## 2017-01-01 PROCEDURE — 83605 ASSAY OF LACTIC ACID: CPT | Performed by: PHYSICIAN ASSISTANT

## 2017-01-01 PROCEDURE — 25010000002 PROPOFOL 10 MG/ML EMULSION

## 2017-01-01 RX ORDER — ACETAMINOPHEN 325 MG/1
650 TABLET ORAL EVERY 4 HOURS PRN
Status: DISCONTINUED | OUTPATIENT
Start: 2017-01-01 | End: 2017-01-01 | Stop reason: HOSPADM

## 2017-01-01 RX ORDER — LEVOFLOXACIN 500 MG/1
500 TABLET, FILM COATED ORAL DAILY
Qty: 5 TABLET | Refills: 0 | Status: SHIPPED | OUTPATIENT
Start: 2017-01-01 | End: 2017-01-01

## 2017-01-01 RX ORDER — LORAZEPAM 2 MG/ML
2 CONCENTRATE ORAL
Status: DISCONTINUED | OUTPATIENT
Start: 2017-01-01 | End: 2017-01-01 | Stop reason: HOSPADM

## 2017-01-01 RX ORDER — BUMETANIDE 0.25 MG/ML
4 INJECTION INTRAMUSCULAR; INTRAVENOUS ONCE
Status: COMPLETED | OUTPATIENT
Start: 2017-01-01 | End: 2017-01-01

## 2017-01-01 RX ORDER — VANCOMYCIN HYDROCHLORIDE 1 G/200ML
1000 INJECTION, SOLUTION INTRAVENOUS EVERY 24 HOURS
Status: DISCONTINUED | OUTPATIENT
Start: 2017-01-01 | End: 2017-01-01

## 2017-01-01 RX ORDER — ONDANSETRON 4 MG/1
8 TABLET, FILM COATED ORAL EVERY 8 HOURS PRN
Status: DISCONTINUED | OUTPATIENT
Start: 2017-01-01 | End: 2017-01-01 | Stop reason: HOSPADM

## 2017-01-01 RX ORDER — GLYCOPYRROLATE 0.2 MG/ML
0.4 INJECTION INTRAMUSCULAR; INTRAVENOUS
Status: DISCONTINUED | OUTPATIENT
Start: 2017-01-01 | End: 2017-01-01 | Stop reason: HOSPADM

## 2017-01-01 RX ORDER — LORAZEPAM 2 MG/ML
0.5 INJECTION INTRAMUSCULAR
Status: DISCONTINUED | OUTPATIENT
Start: 2017-01-01 | End: 2017-01-01

## 2017-01-01 RX ORDER — SODIUM CHLORIDE 9 MG/ML
250 INJECTION, SOLUTION INTRAVENOUS ONCE
Status: CANCELLED | OUTPATIENT
Start: 2017-01-01

## 2017-01-01 RX ORDER — ONDANSETRON 4 MG/1
4 TABLET, ORALLY DISINTEGRATING ORAL EVERY 6 HOURS PRN
Status: DISCONTINUED | OUTPATIENT
Start: 2017-01-01 | End: 2017-01-01

## 2017-01-01 RX ORDER — DEXTROSE MONOHYDRATE 25 G/50ML
25 INJECTION, SOLUTION INTRAVENOUS ONCE
Status: COMPLETED | OUTPATIENT
Start: 2017-01-01 | End: 2017-01-01

## 2017-01-01 RX ORDER — SODIUM CHLORIDE 9 MG/ML
250 INJECTION, SOLUTION INTRAVENOUS ONCE
Status: DISCONTINUED | OUTPATIENT
Start: 2017-01-01 | End: 2017-01-01 | Stop reason: HOSPADM

## 2017-01-01 RX ORDER — ONDANSETRON 4 MG/1
8 TABLET, FILM COATED ORAL EVERY 8 HOURS PRN
Status: DISCONTINUED | OUTPATIENT
Start: 2017-01-01 | End: 2017-01-01

## 2017-01-01 RX ORDER — ONDANSETRON 4 MG/1
4 TABLET, ORALLY DISINTEGRATING ORAL EVERY 6 HOURS PRN
Status: CANCELLED | OUTPATIENT
Start: 2017-01-01

## 2017-01-01 RX ORDER — LORAZEPAM 2 MG/ML
1 CONCENTRATE ORAL
Status: DISCONTINUED | OUTPATIENT
Start: 2017-01-01 | End: 2017-01-01 | Stop reason: HOSPADM

## 2017-01-01 RX ORDER — ONDANSETRON 2 MG/ML
4 INJECTION INTRAMUSCULAR; INTRAVENOUS EVERY 6 HOURS PRN
Status: DISCONTINUED | OUTPATIENT
Start: 2017-01-01 | End: 2017-01-01

## 2017-01-01 RX ORDER — SODIUM CHLORIDE 0.9 % (FLUSH) 0.9 %
10 SYRINGE (ML) INJECTION AS NEEDED
Status: DISCONTINUED | OUTPATIENT
Start: 2017-01-01 | End: 2017-01-01 | Stop reason: HOSPADM

## 2017-01-01 RX ORDER — LORAZEPAM 2 MG/ML
0.5 INJECTION INTRAMUSCULAR
Status: DISCONTINUED | OUTPATIENT
Start: 2017-01-01 | End: 2017-01-01 | Stop reason: HOSPADM

## 2017-01-01 RX ORDER — DIPHENHYDRAMINE HCL 25 MG
25 CAPSULE ORAL ONCE
Status: COMPLETED | OUTPATIENT
Start: 2017-01-01 | End: 2017-01-01

## 2017-01-01 RX ORDER — LIDOCAINE HYDROCHLORIDE 10 MG/ML
0.5 INJECTION, SOLUTION INFILTRATION; PERINEURAL ONCE AS NEEDED
Status: DISCONTINUED | OUTPATIENT
Start: 2017-01-01 | End: 2017-01-01

## 2017-01-01 RX ORDER — SCOLOPAMINE TRANSDERMAL SYSTEM 1 MG/1
1 PATCH, EXTENDED RELEASE TRANSDERMAL
Status: DISCONTINUED | OUTPATIENT
Start: 2017-01-01 | End: 2017-01-01 | Stop reason: HOSPADM

## 2017-01-01 RX ORDER — HYDROCODONE BITARTRATE AND ACETAMINOPHEN 5; 325 MG/1; MG/1
1 TABLET ORAL EVERY 4 HOURS PRN
Status: DISCONTINUED | OUTPATIENT
Start: 2017-01-01 | End: 2017-01-01 | Stop reason: HOSPADM

## 2017-01-01 RX ORDER — ONDANSETRON 2 MG/ML
4 INJECTION INTRAMUSCULAR; INTRAVENOUS EVERY 6 HOURS PRN
Status: DISCONTINUED | OUTPATIENT
Start: 2017-01-01 | End: 2017-01-01 | Stop reason: HOSPADM

## 2017-01-01 RX ORDER — LOPERAMIDE HYDROCHLORIDE 2 MG/1
2 CAPSULE ORAL 4 TIMES DAILY PRN
Status: DISCONTINUED | OUTPATIENT
Start: 2017-01-01 | End: 2017-01-01 | Stop reason: HOSPADM

## 2017-01-01 RX ORDER — ONDANSETRON 2 MG/ML
4 INJECTION INTRAMUSCULAR; INTRAVENOUS EVERY 4 HOURS PRN
Status: DISCONTINUED | OUTPATIENT
Start: 2017-01-01 | End: 2017-01-01 | Stop reason: HOSPADM

## 2017-01-01 RX ORDER — LEVOFLOXACIN 5 MG/ML
500 INJECTION, SOLUTION INTRAVENOUS EVERY 24 HOURS
Status: DISCONTINUED | OUTPATIENT
Start: 2017-01-01 | End: 2017-01-01 | Stop reason: HOSPADM

## 2017-01-01 RX ORDER — SODIUM CHLORIDE 9 MG/ML
250 INJECTION, SOLUTION INTRAVENOUS ONCE
Status: COMPLETED | OUTPATIENT
Start: 2017-01-01 | End: 2017-01-01

## 2017-01-01 RX ORDER — POLYETHYLENE GLYCOL 3350 17 G/17G
17 POWDER, FOR SOLUTION ORAL DAILY PRN
Status: DISCONTINUED | OUTPATIENT
Start: 2017-01-01 | End: 2017-01-01 | Stop reason: HOSPADM

## 2017-01-01 RX ORDER — FENTANYL CITRATE 50 UG/ML
INJECTION, SOLUTION INTRAMUSCULAR; INTRAVENOUS
Status: COMPLETED
Start: 2017-01-01 | End: 2017-01-01

## 2017-01-01 RX ORDER — MAGNESIUM SULFATE HEPTAHYDRATE 40 MG/ML
2 INJECTION, SOLUTION INTRAVENOUS ONCE
Status: DISCONTINUED | OUTPATIENT
Start: 2017-01-01 | End: 2017-01-01 | Stop reason: HOSPADM

## 2017-01-01 RX ORDER — LORAZEPAM 2 MG/ML
1 INJECTION INTRAMUSCULAR
Status: DISCONTINUED | OUTPATIENT
Start: 2017-01-01 | End: 2017-01-01 | Stop reason: HOSPADM

## 2017-01-01 RX ORDER — ONDANSETRON 4 MG/1
8 TABLET, FILM COATED ORAL EVERY 8 HOURS PRN
Status: CANCELLED | OUTPATIENT
Start: 2017-01-01

## 2017-01-01 RX ORDER — LORAZEPAM 2 MG/ML
2 INJECTION INTRAMUSCULAR
Status: DISCONTINUED | OUTPATIENT
Start: 2017-01-01 | End: 2017-01-01 | Stop reason: HOSPADM

## 2017-01-01 RX ORDER — MORPHINE SULFATE 2 MG/ML
2 INJECTION, SOLUTION INTRAMUSCULAR; INTRAVENOUS
Status: DISCONTINUED | OUTPATIENT
Start: 2017-01-01 | End: 2017-01-01 | Stop reason: HOSPADM

## 2017-01-01 RX ORDER — ALBUMIN (HUMAN) 12.5 G/50ML
12.5 SOLUTION INTRAVENOUS ONCE
Status: COMPLETED | OUTPATIENT
Start: 2017-01-01 | End: 2017-01-01

## 2017-01-01 RX ORDER — ACETAMINOPHEN 325 MG/1
650 TABLET ORAL ONCE
Status: COMPLETED | OUTPATIENT
Start: 2017-01-01 | End: 2017-01-01

## 2017-01-01 RX ORDER — SENNA AND DOCUSATE SODIUM 50; 8.6 MG/1; MG/1
2 TABLET, FILM COATED ORAL NIGHTLY
Status: DISCONTINUED | OUTPATIENT
Start: 2017-01-01 | End: 2017-01-01

## 2017-01-01 RX ORDER — HYDROCODONE BITARTRATE AND ACETAMINOPHEN 5; 325 MG/1; MG/1
1 TABLET ORAL EVERY 4 HOURS PRN
Qty: 90 TABLET | Refills: 0 | Status: ON HOLD | OUTPATIENT
Start: 2017-01-01 | End: 2017-01-01

## 2017-01-01 RX ORDER — SODIUM CHLORIDE 0.9 % (FLUSH) 0.9 %
10 SYRINGE (ML) INJECTION AS NEEDED
Status: CANCELLED | OUTPATIENT
Start: 2017-01-01

## 2017-01-01 RX ORDER — SUCRALFATE 1 G/1
1 TABLET ORAL 4 TIMES DAILY
Status: DISCONTINUED | OUTPATIENT
Start: 2017-01-01 | End: 2017-01-01 | Stop reason: HOSPADM

## 2017-01-01 RX ORDER — MORPHINE SULFATE 100 MG/5ML
5 SOLUTION ORAL
Status: DISCONTINUED | OUTPATIENT
Start: 2017-01-01 | End: 2017-01-01 | Stop reason: HOSPADM

## 2017-01-01 RX ORDER — GLYCOPYRROLATE 0.2 MG/ML
0.2 INJECTION INTRAMUSCULAR; INTRAVENOUS
Status: DISCONTINUED | OUTPATIENT
Start: 2017-01-01 | End: 2017-01-01 | Stop reason: HOSPADM

## 2017-01-01 RX ORDER — AMLODIPINE BESYLATE 10 MG/1
TABLET ORAL
Qty: 90 TABLET | Refills: 3 | Status: SHIPPED | OUTPATIENT
Start: 2017-01-01 | End: 2017-01-01

## 2017-01-01 RX ORDER — MORPHINE SULFATE 2 MG/ML
1 INJECTION, SOLUTION INTRAMUSCULAR; INTRAVENOUS EVERY 4 HOURS PRN
Status: DISCONTINUED | OUTPATIENT
Start: 2017-01-01 | End: 2017-01-01 | Stop reason: HOSPADM

## 2017-01-01 RX ORDER — SODIUM CHLORIDE 9 MG/ML
250 INJECTION, SOLUTION INTRAVENOUS ONCE
OUTPATIENT
Start: 2017-04-17

## 2017-01-01 RX ORDER — ONDANSETRON 2 MG/ML
4 INJECTION INTRAMUSCULAR; INTRAVENOUS EVERY 6 HOURS PRN
Status: CANCELLED | OUTPATIENT
Start: 2017-01-01

## 2017-01-01 RX ORDER — FENTANYL CITRATE 50 UG/ML
75 INJECTION, SOLUTION INTRAMUSCULAR; INTRAVENOUS
Status: DISCONTINUED | OUTPATIENT
Start: 2017-01-01 | End: 2017-01-01

## 2017-01-01 RX ORDER — IPRATROPIUM BROMIDE AND ALBUTEROL SULFATE 2.5; .5 MG/3ML; MG/3ML
3 SOLUTION RESPIRATORY (INHALATION)
Status: DISCONTINUED | OUTPATIENT
Start: 2017-01-01 | End: 2017-01-01

## 2017-01-01 RX ORDER — HEPARIN SODIUM (PORCINE) LOCK FLUSH IV SOLN 100 UNIT/ML 100 UNIT/ML
500 SOLUTION INTRAVENOUS AS NEEDED
Status: CANCELLED | OUTPATIENT
Start: 2017-01-01

## 2017-01-01 RX ORDER — LORAZEPAM 2 MG/ML
0.5 CONCENTRATE ORAL
Status: DISCONTINUED | OUTPATIENT
Start: 2017-01-01 | End: 2017-01-01 | Stop reason: HOSPADM

## 2017-01-01 RX ORDER — MAGNESIUM SULFATE IN 0.9% NACL 2 G/100 ML
2 INTRAVENOUS SOLUTION, PIGGYBACK (ML) INTRAVENOUS ONCE
Status: COMPLETED | OUTPATIENT
Start: 2017-01-01 | End: 2017-01-01

## 2017-01-01 RX ORDER — HEPARIN SODIUM 5000 [USP'U]/ML
5000 INJECTION, SOLUTION INTRAVENOUS; SUBCUTANEOUS EVERY 8 HOURS SCHEDULED
Status: DISCONTINUED | OUTPATIENT
Start: 2017-01-01 | End: 2017-01-01

## 2017-01-01 RX ORDER — LORAZEPAM 1 MG/1
2 TABLET ORAL
Status: DISCONTINUED | OUTPATIENT
Start: 2017-01-01 | End: 2017-01-01 | Stop reason: HOSPADM

## 2017-01-01 RX ORDER — LORAZEPAM 2 MG/ML
0.5 INJECTION INTRAMUSCULAR 2 TIMES DAILY PRN
Status: DISCONTINUED | OUTPATIENT
Start: 2017-01-01 | End: 2017-01-01

## 2017-01-01 RX ORDER — SODIUM CHLORIDE 9 MG/ML
30 INJECTION, SOLUTION INTRAVENOUS CONTINUOUS
Status: DISCONTINUED | OUTPATIENT
Start: 2017-01-01 | End: 2017-01-01

## 2017-01-01 RX ORDER — DIGOXIN 0.25 MG/ML
500 INJECTION INTRAMUSCULAR; INTRAVENOUS ONCE
Status: DISCONTINUED | OUTPATIENT
Start: 2017-01-01 | End: 2017-01-01

## 2017-01-01 RX ORDER — MORPHINE SULFATE 10 MG/ML
6 INJECTION INTRAMUSCULAR; INTRAVENOUS; SUBCUTANEOUS
Status: DISCONTINUED | OUTPATIENT
Start: 2017-01-01 | End: 2017-01-01 | Stop reason: HOSPADM

## 2017-01-01 RX ORDER — SPIRONOLACTONE 50 MG/1
50 TABLET, FILM COATED ORAL DAILY
Qty: 30 TABLET | Refills: 0 | Status: ON HOLD | OUTPATIENT
Start: 2017-01-01 | End: 2017-01-01

## 2017-01-01 RX ORDER — FENTANYL CITRATE 50 UG/ML
50 INJECTION, SOLUTION INTRAMUSCULAR; INTRAVENOUS
Status: DISCONTINUED | OUTPATIENT
Start: 2017-01-01 | End: 2017-01-01

## 2017-01-01 RX ORDER — PROCHLORPERAZINE MALEATE 10 MG
10 TABLET ORAL EVERY 6 HOURS PRN
Qty: 60 TABLET | Refills: 5 | Status: ON HOLD | OUTPATIENT
Start: 2017-01-01 | End: 2017-01-01

## 2017-01-01 RX ORDER — FAMOTIDINE 10 MG/ML
20 INJECTION, SOLUTION INTRAVENOUS DAILY
Status: DISCONTINUED | OUTPATIENT
Start: 2017-01-01 | End: 2017-01-01

## 2017-01-01 RX ORDER — OXYCODONE HYDROCHLORIDE 10 MG/1
10 TABLET ORAL EVERY 4 HOURS PRN
Qty: 100 TABLET | Refills: 0 | Status: SHIPPED | OUTPATIENT
Start: 2017-01-01 | End: 2017-01-01

## 2017-01-01 RX ORDER — POTASSIUM CHLORIDE 1500 MG/1
20 TABLET, FILM COATED, EXTENDED RELEASE ORAL DAILY
Qty: 30 TABLET
Start: 2017-01-01 | End: 2017-01-01 | Stop reason: SINTOL

## 2017-01-01 RX ORDER — MORPHINE SULFATE 100 MG/5ML
20 SOLUTION ORAL
Status: DISCONTINUED | OUTPATIENT
Start: 2017-01-01 | End: 2017-01-01 | Stop reason: HOSPADM

## 2017-01-01 RX ORDER — DIPHENOXYLATE HYDROCHLORIDE AND ATROPINE SULFATE 2.5; .025 MG/1; MG/1
2 TABLET ORAL EVERY 6 HOURS PRN
Status: DISCONTINUED | OUTPATIENT
Start: 2017-01-01 | End: 2017-01-01 | Stop reason: HOSPADM

## 2017-01-01 RX ORDER — SODIUM CHLORIDE 0.9 % (FLUSH) 0.9 %
3 SYRINGE (ML) INJECTION AS NEEDED
Status: DISCONTINUED | OUTPATIENT
Start: 2017-01-01 | End: 2017-01-01

## 2017-01-01 RX ORDER — FENTANYL CITRATE 50 UG/ML
100 INJECTION, SOLUTION INTRAMUSCULAR; INTRAVENOUS
Status: DISCONTINUED | OUTPATIENT
Start: 2017-01-01 | End: 2017-01-01

## 2017-01-01 RX ORDER — PROPOFOL 10 MG/ML
VIAL (ML) INTRAVENOUS
Status: COMPLETED
Start: 2017-01-01 | End: 2017-01-01

## 2017-01-01 RX ORDER — FUROSEMIDE 20 MG/1
20 TABLET ORAL DAILY
Qty: 7 TABLET | Refills: 0 | Status: SHIPPED | OUTPATIENT
Start: 2017-01-01 | End: 2017-01-01

## 2017-01-01 RX ORDER — OXYCODONE HYDROCHLORIDE 5 MG/1
10 TABLET ORAL EVERY 4 HOURS PRN
Status: DISCONTINUED | OUTPATIENT
Start: 2017-01-01 | End: 2017-01-01 | Stop reason: HOSPADM

## 2017-01-01 RX ORDER — LEVOFLOXACIN 500 MG/1
500 TABLET, FILM COATED ORAL DAILY
Qty: 7 TABLET | Refills: 0 | Status: SHIPPED | OUTPATIENT
Start: 2017-01-01 | End: 2017-01-01

## 2017-01-01 RX ORDER — FAMOTIDINE 10 MG/ML
20 INJECTION, SOLUTION INTRAVENOUS 2 TIMES DAILY
Status: DISCONTINUED | OUTPATIENT
Start: 2017-01-01 | End: 2017-01-01

## 2017-01-01 RX ORDER — SODIUM CHLORIDE 0.9 % (FLUSH) 0.9 %
1-10 SYRINGE (ML) INJECTION AS NEEDED
Status: DISCONTINUED | OUTPATIENT
Start: 2017-01-01 | End: 2017-01-01 | Stop reason: HOSPADM

## 2017-01-01 RX ORDER — SODIUM BICARBONATE 650 MG/1
1300 TABLET ORAL 3 TIMES DAILY
Status: DISCONTINUED | OUTPATIENT
Start: 2017-01-01 | End: 2017-01-01

## 2017-01-01 RX ORDER — LORAZEPAM 0.5 MG/1
0.5 TABLET ORAL
Status: DISCONTINUED | OUTPATIENT
Start: 2017-01-01 | End: 2017-01-01 | Stop reason: HOSPADM

## 2017-01-01 RX ORDER — CEFTRIAXONE SODIUM 1 G/50ML
1 INJECTION, SOLUTION INTRAVENOUS ONCE
Status: COMPLETED | OUTPATIENT
Start: 2017-01-01 | End: 2017-01-01

## 2017-01-01 RX ORDER — FENTANYL CITRATE 50 UG/ML
25 INJECTION, SOLUTION INTRAMUSCULAR; INTRAVENOUS
Status: DISCONTINUED | OUTPATIENT
Start: 2017-01-01 | End: 2017-01-01

## 2017-01-01 RX ORDER — SODIUM CHLORIDE, SODIUM LACTATE, POTASSIUM CHLORIDE, CALCIUM CHLORIDE 600; 310; 30; 20 MG/100ML; MG/100ML; MG/100ML; MG/100ML
1000 INJECTION, SOLUTION INTRAVENOUS CONTINUOUS PRN
Status: DISCONTINUED | OUTPATIENT
Start: 2017-01-01 | End: 2017-01-01 | Stop reason: HOSPADM

## 2017-01-01 RX ORDER — DEXTROSE MONOHYDRATE 25 G/50ML
25 INJECTION, SOLUTION INTRAVENOUS
Status: DISCONTINUED | OUTPATIENT
Start: 2017-01-01 | End: 2017-01-01

## 2017-01-01 RX ORDER — LORAZEPAM 1 MG/1
1 TABLET ORAL
Status: DISCONTINUED | OUTPATIENT
Start: 2017-01-01 | End: 2017-01-01 | Stop reason: HOSPADM

## 2017-01-01 RX ORDER — HEPARIN SODIUM 1000 [USP'U]/ML
500 INJECTION, SOLUTION INTRAVENOUS; SUBCUTANEOUS ONCE
Status: DISCONTINUED | OUTPATIENT
Start: 2017-01-01 | End: 2017-01-01 | Stop reason: HOSPADM

## 2017-01-01 RX ORDER — PROMETHAZINE HYDROCHLORIDE 25 MG/1
12.5 TABLET ORAL EVERY 6 HOURS PRN
Status: ON HOLD | COMMUNITY
End: 2017-01-01

## 2017-01-01 RX ORDER — PANTOPRAZOLE SODIUM 40 MG/1
40 TABLET, DELAYED RELEASE ORAL
Status: DISCONTINUED | OUTPATIENT
Start: 2017-01-01 | End: 2017-01-01 | Stop reason: HOSPADM

## 2017-01-01 RX ORDER — LORAZEPAM 0.5 MG/1
0.5 TABLET ORAL EVERY 8 HOURS PRN
Status: DISCONTINUED | OUTPATIENT
Start: 2017-01-01 | End: 2017-01-01 | Stop reason: HOSPADM

## 2017-01-01 RX ORDER — PROPOFOL 10 MG/ML
VIAL (ML) INTRAVENOUS CONTINUOUS PRN
Status: DISCONTINUED | OUTPATIENT
Start: 2017-01-01 | End: 2017-01-01 | Stop reason: SURG

## 2017-01-01 RX ORDER — NICOTINE POLACRILEX 4 MG
15 LOZENGE BUCCAL
Status: DISCONTINUED | OUTPATIENT
Start: 2017-01-01 | End: 2017-01-01

## 2017-01-01 RX ORDER — MAGNESIUM SULFATE HEPTAHYDRATE 40 MG/ML
2 INJECTION, SOLUTION INTRAVENOUS ONCE
Status: COMPLETED | OUTPATIENT
Start: 2017-01-01 | End: 2017-01-01

## 2017-01-01 RX ORDER — SODIUM CHLORIDE 9 MG/ML
75 INJECTION, SOLUTION INTRAVENOUS CONTINUOUS
Status: DISCONTINUED | OUTPATIENT
Start: 2017-01-01 | End: 2017-01-01

## 2017-01-01 RX ORDER — SODIUM CHLORIDE 0.9 % (FLUSH) 0.9 %
1-10 SYRINGE (ML) INJECTION AS NEEDED
Status: CANCELLED | OUTPATIENT
Start: 2017-01-01

## 2017-01-01 RX ORDER — MORPHINE SULFATE 100 MG/5ML
10 SOLUTION ORAL
Status: DISCONTINUED | OUTPATIENT
Start: 2017-01-01 | End: 2017-01-01 | Stop reason: HOSPADM

## 2017-01-01 RX ORDER — GLYCOPYRROLATE 0.2 MG/ML
0.2 INJECTION INTRAMUSCULAR; INTRAVENOUS EVERY 4 HOURS PRN
Status: DISCONTINUED | OUTPATIENT
Start: 2017-01-01 | End: 2017-01-01

## 2017-01-01 RX ORDER — FENTANYL CITRATE 50 UG/ML
75 INJECTION, SOLUTION INTRAMUSCULAR; INTRAVENOUS ONCE
Status: COMPLETED | OUTPATIENT
Start: 2017-01-01 | End: 2017-01-01

## 2017-01-01 RX ORDER — LIDOCAINE WITH 8.4% SOD BICARB 0.9%(10ML)
20 SYRINGE (ML) INJECTION ONCE
Status: COMPLETED | OUTPATIENT
Start: 2017-01-01 | End: 2017-01-01

## 2017-01-01 RX ADMIN — Medication 2 TABLET: at 15:18

## 2017-01-01 RX ADMIN — DEXTROSE MONOHYDRATE 25 G: 25 INJECTION, SOLUTION INTRAVENOUS at 23:52

## 2017-01-01 RX ADMIN — MORPHINE SULFATE 4 MG: 4 INJECTION, SOLUTION INTRAMUSCULAR; INTRAVENOUS at 10:27

## 2017-01-01 RX ADMIN — PANTOPRAZOLE SODIUM 40 MG: 40 TABLET, DELAYED RELEASE ORAL at 06:01

## 2017-01-01 RX ADMIN — MORPHINE SULFATE 4 MG: 4 INJECTION, SOLUTION INTRAMUSCULAR; INTRAVENOUS at 15:45

## 2017-01-01 RX ADMIN — SUCRALFATE 1 G: 1 TABLET ORAL at 20:15

## 2017-01-01 RX ADMIN — FENTANYL CITRATE 25 MCG: 50 INJECTION INTRAMUSCULAR; INTRAVENOUS at 08:13

## 2017-01-01 RX ADMIN — MORPHINE SULFATE 4 MG: 4 INJECTION, SOLUTION INTRAMUSCULAR; INTRAVENOUS at 20:44

## 2017-01-01 RX ADMIN — SCOPALAMINE 1 PATCH: 1 PATCH, EXTENDED RELEASE TRANSDERMAL at 15:45

## 2017-01-01 RX ADMIN — HYDROCODONE BITARTRATE AND ACETAMINOPHEN 1 TABLET: 5; 325 TABLET ORAL at 04:29

## 2017-01-01 RX ADMIN — SODIUM CHLORIDE, POTASSIUM CHLORIDE, SODIUM LACTATE AND CALCIUM CHLORIDE 1000 ML: 600; 310; 30; 20 INJECTION, SOLUTION INTRAVENOUS at 12:36

## 2017-01-01 RX ADMIN — ENOXAPARIN SODIUM 40 MG: 40 INJECTION SUBCUTANEOUS at 09:34

## 2017-01-01 RX ADMIN — SODIUM BICARBONATE 50 MEQ: 84 INJECTION, SOLUTION INTRAVENOUS at 23:54

## 2017-01-01 RX ADMIN — SUCRALFATE 1 G: 1 TABLET ORAL at 11:34

## 2017-01-01 RX ADMIN — SODIUM CHLORIDE 1000 MG: 900 INJECTION, SOLUTION INTRAVENOUS at 10:02

## 2017-01-01 RX ADMIN — HYDROCODONE BITARTRATE AND ACETAMINOPHEN 1 TABLET: 5; 325 TABLET ORAL at 10:10

## 2017-01-01 RX ADMIN — DEXAMETHASONE SODIUM PHOSPHATE 12 MG: 10 INJECTION INTRAMUSCULAR; INTRAVENOUS at 08:28

## 2017-01-01 RX ADMIN — VANCOMYCIN HYDROCHLORIDE 1250 MG: 1 INJECTION, POWDER, LYOPHILIZED, FOR SOLUTION INTRAVENOUS at 02:06

## 2017-01-01 RX ADMIN — LORAZEPAM 0.5 MG: 2 INJECTION INTRAMUSCULAR; INTRAVENOUS at 17:20

## 2017-01-01 RX ADMIN — HEPARIN SODIUM 5000 UNITS: 5000 INJECTION, SOLUTION INTRAVENOUS; SUBCUTANEOUS at 06:00

## 2017-01-01 RX ADMIN — Medication 2 TABLET: at 18:42

## 2017-01-01 RX ADMIN — CALCIUM GLUCONATE 1 G: 94 INJECTION, SOLUTION INTRAVENOUS at 23:58

## 2017-01-01 RX ADMIN — SUCRALFATE 1 G: 1 TABLET ORAL at 21:42

## 2017-01-01 RX ADMIN — THIAMINE HYDROCHLORIDE 100 MG: 100 INJECTION, SOLUTION INTRAMUSCULAR; INTRAVENOUS at 06:12

## 2017-01-01 RX ADMIN — HYDROCORTISONE SODIUM SUCCINATE 50 MG: 100 INJECTION, POWDER, FOR SOLUTION INTRAMUSCULAR; INTRAVENOUS at 21:46

## 2017-01-01 RX ADMIN — LORAZEPAM 2 MG: 2 INJECTION, SOLUTION INTRAMUSCULAR; INTRAVENOUS at 22:15

## 2017-01-01 RX ADMIN — SUCRALFATE 1 G: 1 TABLET ORAL at 08:27

## 2017-01-01 RX ADMIN — DILTIAZEM HYDROCHLORIDE 5 MG/HR: 100 INJECTION, POWDER, LYOPHILIZED, FOR SOLUTION INTRAVENOUS at 10:10

## 2017-01-01 RX ADMIN — SODIUM CHLORIDE 1000 MG: 900 INJECTION, SOLUTION INTRAVENOUS at 14:08

## 2017-01-01 RX ADMIN — SODIUM CHLORIDE 1000 MG: 900 INJECTION, SOLUTION INTRAVENOUS at 10:51

## 2017-01-01 RX ADMIN — FENTANYL CITRATE 75 MCG: 50 INJECTION INTRAMUSCULAR; INTRAVENOUS at 17:46

## 2017-01-01 RX ADMIN — PANTOPRAZOLE SODIUM 40 MG: 40 TABLET, DELAYED RELEASE ORAL at 05:58

## 2017-01-01 RX ADMIN — ACETAMINOPHEN 650 MG: 325 TABLET ORAL at 15:00

## 2017-01-01 RX ADMIN — DEXAMETHASONE SODIUM PHOSPHATE 12 MG: 10 INJECTION INTRAMUSCULAR; INTRAVENOUS at 09:41

## 2017-01-01 RX ADMIN — ENOXAPARIN SODIUM 40 MG: 40 INJECTION SUBCUTANEOUS at 08:24

## 2017-01-01 RX ADMIN — HYDROCODONE BITARTRATE AND ACETAMINOPHEN 1 TABLET: 5; 325 TABLET ORAL at 20:23

## 2017-01-01 RX ADMIN — IOPAMIDOL 85 ML: 612 INJECTION, SOLUTION INTRAVENOUS at 09:21

## 2017-01-01 RX ADMIN — MORPHINE SULFATE 4 MG: 4 INJECTION, SOLUTION INTRAMUSCULAR; INTRAVENOUS at 02:07

## 2017-01-01 RX ADMIN — ALBUMIN HUMAN 12.5 G: 0.25 SOLUTION INTRAVENOUS at 13:41

## 2017-01-01 RX ADMIN — SUCRALFATE 1 G: 1 TABLET ORAL at 18:24

## 2017-01-01 RX ADMIN — LORAZEPAM 0.5 MG: 2 INJECTION INTRAMUSCULAR; INTRAVENOUS at 12:48

## 2017-01-01 RX ADMIN — SODIUM CHLORIDE 125 ML/HR: 9 INJECTION, SOLUTION INTRAVENOUS at 23:23

## 2017-01-01 RX ADMIN — THIAMINE HYDROCHLORIDE 100 MG: 100 INJECTION, SOLUTION INTRAMUSCULAR; INTRAVENOUS at 17:48

## 2017-01-01 RX ADMIN — MORPHINE SULFATE 4 MG: 4 INJECTION, SOLUTION INTRAMUSCULAR; INTRAVENOUS at 04:44

## 2017-01-01 RX ADMIN — SODIUM CHLORIDE 250 ML: 900 INJECTION, SOLUTION INTRAVENOUS at 09:23

## 2017-01-01 RX ADMIN — PROCHLORPERAZINE EDISYLATE 10 MG: 5 INJECTION INTRAMUSCULAR; INTRAVENOUS at 09:58

## 2017-01-01 RX ADMIN — DEXAMETHASONE SODIUM PHOSPHATE 12 MG: 10 INJECTION INTRAMUSCULAR; INTRAVENOUS at 13:48

## 2017-01-01 RX ADMIN — PANTOPRAZOLE SODIUM 40 MG: 40 TABLET, DELAYED RELEASE ORAL at 05:42

## 2017-01-01 RX ADMIN — SUCRALFATE 1 G: 1 TABLET ORAL at 18:42

## 2017-01-01 RX ADMIN — SODIUM CHLORIDE, PRESERVATIVE FREE 500 UNITS: 5 INJECTION INTRAVENOUS at 11:05

## 2017-01-01 RX ADMIN — SODIUM CHLORIDE 1250 MG: 900 INJECTION, SOLUTION INTRAVENOUS at 08:45

## 2017-01-01 RX ADMIN — Medication 500 UNITS: at 04:45

## 2017-01-01 RX ADMIN — LEVOFLOXACIN 500 MG: 5 INJECTION, SOLUTION INTRAVENOUS at 03:35

## 2017-01-01 RX ADMIN — Medication 2 TABLET: at 18:54

## 2017-01-01 RX ADMIN — SODIUM CHLORIDE 125 ML/HR: 9 INJECTION, SOLUTION INTRAVENOUS at 09:49

## 2017-01-01 RX ADMIN — GLYCOPYRROLATE 0.4 MG: 0.2 INJECTION, SOLUTION INTRAMUSCULAR; INTRAVENOUS at 20:09

## 2017-01-01 RX ADMIN — FENTANYL CITRATE 75 MCG: 50 INJECTION INTRAMUSCULAR; INTRAVENOUS at 21:41

## 2017-01-01 RX ADMIN — DEXAMETHASONE SODIUM PHOSPHATE 12 MG: 10 INJECTION INTRAMUSCULAR; INTRAVENOUS at 09:30

## 2017-01-01 RX ADMIN — Medication 2 TABLET: at 21:37

## 2017-01-01 RX ADMIN — MORPHINE SULFATE 4 MG: 4 INJECTION, SOLUTION INTRAMUSCULAR; INTRAVENOUS at 12:49

## 2017-01-01 RX ADMIN — SODIUM CHLORIDE 250 ML: 900 INJECTION, SOLUTION INTRAVENOUS at 08:50

## 2017-01-01 RX ADMIN — HYDROCODONE BITARTRATE AND ACETAMINOPHEN 1 TABLET: 5; 325 TABLET ORAL at 23:40

## 2017-01-01 RX ADMIN — HEPARIN SODIUM 5000 UNITS: 5000 INJECTION, SOLUTION INTRAVENOUS; SUBCUTANEOUS at 21:48

## 2017-01-01 RX ADMIN — PANTOPRAZOLE SODIUM 40 MG: 40 TABLET, DELAYED RELEASE ORAL at 06:39

## 2017-01-01 RX ADMIN — HEPARIN SODIUM 5000 UNITS: 5000 INJECTION, SOLUTION INTRAVENOUS; SUBCUTANEOUS at 15:02

## 2017-01-01 RX ADMIN — SODIUM CHLORIDE 75 ML/HR: 9 INJECTION, SOLUTION INTRAVENOUS at 11:02

## 2017-01-01 RX ADMIN — Medication 2 TABLET: at 08:25

## 2017-01-01 RX ADMIN — LORAZEPAM 2 MG: 2 INJECTION, SOLUTION INTRAMUSCULAR; INTRAVENOUS at 00:05

## 2017-01-01 RX ADMIN — SUCRALFATE 1 G: 1 TABLET ORAL at 22:20

## 2017-01-01 RX ADMIN — SUCRALFATE 1 G: 1 TABLET ORAL at 13:51

## 2017-01-01 RX ADMIN — LORAZEPAM 0.5 MG: 2 INJECTION INTRAMUSCULAR; INTRAVENOUS at 12:49

## 2017-01-01 RX ADMIN — DEXAMETHASONE SODIUM PHOSPHATE 12 MG: 10 INJECTION, SOLUTION INTRAMUSCULAR; INTRAVENOUS at 10:30

## 2017-01-01 RX ADMIN — CEFTRIAXONE SODIUM 1 G: 1 INJECTION, SOLUTION INTRAVENOUS at 23:11

## 2017-01-01 RX ADMIN — FENTANYL CITRATE 25 MCG: 50 INJECTION INTRAMUSCULAR; INTRAVENOUS at 15:21

## 2017-01-01 RX ADMIN — Medication 10 ML: at 16:19

## 2017-01-01 RX ADMIN — SODIUM CHLORIDE 250 ML: 900 INJECTION, SOLUTION INTRAVENOUS at 09:41

## 2017-01-01 RX ADMIN — MAGNESIUM SULFATE IN WATER 2 G: 40 INJECTION, SOLUTION INTRAVENOUS at 14:49

## 2017-01-01 RX ADMIN — GLYCOPYRROLATE 0.4 MG: 0.2 INJECTION, SOLUTION INTRAMUSCULAR; INTRAVENOUS at 00:05

## 2017-01-01 RX ADMIN — GLYCOPYRROLATE 0.2 MG: 0.2 INJECTION, SOLUTION INTRAMUSCULAR; INTRAVENOUS at 12:48

## 2017-01-01 RX ADMIN — PROPOFOL 50 MG: 10 INJECTION, EMULSION INTRAVENOUS at 12:50

## 2017-01-01 RX ADMIN — Medication 500 UNITS: at 19:20

## 2017-01-01 RX ADMIN — SODIUM CHLORIDE 250 ML: 900 INJECTION, SOLUTION INTRAVENOUS at 10:30

## 2017-01-01 RX ADMIN — CEFEPIME HYDROCHLORIDE 1 G: 1 INJECTION, POWDER, FOR SOLUTION INTRAMUSCULAR; INTRAVENOUS at 12:57

## 2017-01-01 RX ADMIN — MORPHINE SULFATE 4 MG: 4 INJECTION, SOLUTION INTRAMUSCULAR; INTRAVENOUS at 12:48

## 2017-01-01 RX ADMIN — LEVOFLOXACIN 500 MG: 5 INJECTION, SOLUTION INTRAVENOUS at 01:34

## 2017-01-01 RX ADMIN — HYDROCORTISONE SODIUM SUCCINATE 50 MG: 100 INJECTION, POWDER, FOR SOLUTION INTRAMUSCULAR; INTRAVENOUS at 07:34

## 2017-01-01 RX ADMIN — HYDROCODONE BITARTRATE AND ACETAMINOPHEN 1 TABLET: 5; 325 TABLET ORAL at 16:54

## 2017-01-01 RX ADMIN — MORPHINE SULFATE 4 MG: 4 INJECTION, SOLUTION INTRAMUSCULAR; INTRAVENOUS at 20:09

## 2017-01-01 RX ADMIN — LORAZEPAM 0.5 MG: 2 INJECTION INTRAMUSCULAR; INTRAVENOUS at 10:27

## 2017-01-01 RX ADMIN — FENTANYL CITRATE 75 MCG: 50 INJECTION, SOLUTION INTRAMUSCULAR; INTRAVENOUS at 10:43

## 2017-01-01 RX ADMIN — SUCRALFATE 1 G: 1 TABLET ORAL at 09:35

## 2017-01-01 RX ADMIN — FENTANYL CITRATE 75 MCG: 50 INJECTION INTRAMUSCULAR; INTRAVENOUS at 20:34

## 2017-01-01 RX ADMIN — LORAZEPAM 0.5 MG: 2 INJECTION INTRAMUSCULAR; INTRAVENOUS at 00:39

## 2017-01-01 RX ADMIN — PROPOFOL 125 MCG/KG/MIN: 10 INJECTION, EMULSION INTRAVENOUS at 12:50

## 2017-01-01 RX ADMIN — SUCRALFATE 1 G: 1 TABLET ORAL at 07:43

## 2017-01-01 RX ADMIN — FAMOTIDINE 20 MG: 10 INJECTION, SOLUTION INTRAVENOUS at 08:13

## 2017-01-01 RX ADMIN — SODIUM CHLORIDE 500 ML: 9 INJECTION, SOLUTION INTRAVENOUS at 04:07

## 2017-01-01 RX ADMIN — DEXAMETHASONE SODIUM PHOSPHATE 12 MG: 10 INJECTION, SOLUTION INTRAMUSCULAR; INTRAVENOUS at 09:06

## 2017-01-01 RX ADMIN — SODIUM CHLORIDE 1000 MG: 900 INJECTION, SOLUTION INTRAVENOUS at 09:22

## 2017-01-01 RX ADMIN — ENOXAPARIN SODIUM 40 MG: 40 INJECTION SUBCUTANEOUS at 19:20

## 2017-01-01 RX ADMIN — ENOXAPARIN SODIUM 40 MG: 40 INJECTION SUBCUTANEOUS at 09:19

## 2017-01-01 RX ADMIN — SUCRALFATE 1 G: 1 TABLET ORAL at 15:02

## 2017-01-01 RX ADMIN — LORAZEPAM 2 MG: 2 INJECTION, SOLUTION INTRAMUSCULAR; INTRAVENOUS at 20:09

## 2017-01-01 RX ADMIN — SUCRALFATE 1 G: 1 TABLET ORAL at 21:37

## 2017-01-01 RX ADMIN — ALBUMIN HUMAN 12.5 G: 0.25 SOLUTION INTRAVENOUS at 01:43

## 2017-01-01 RX ADMIN — SUCRALFATE 1 G: 1 TABLET ORAL at 18:53

## 2017-01-01 RX ADMIN — HYDROCORTISONE SODIUM SUCCINATE 50 MG: 100 INJECTION, POWDER, FOR SOLUTION INTRAMUSCULAR; INTRAVENOUS at 06:04

## 2017-01-01 RX ADMIN — MORPHINE SULFATE 4 MG: 4 INJECTION, SOLUTION INTRAMUSCULAR; INTRAVENOUS at 00:05

## 2017-01-01 RX ADMIN — SODIUM CHLORIDE 500 ML: 9 INJECTION, SOLUTION INTRAVENOUS at 23:27

## 2017-01-01 RX ADMIN — FENTANYL CITRATE 25 MCG: 50 INJECTION INTRAMUSCULAR; INTRAVENOUS at 14:07

## 2017-01-01 RX ADMIN — BUMETANIDE 4 MG: 0.25 INJECTION, SOLUTION INTRAMUSCULAR; INTRAVENOUS at 10:10

## 2017-01-01 RX ADMIN — HYDROCODONE BITARTRATE AND ACETAMINOPHEN 1 TABLET: 5; 325 TABLET ORAL at 09:06

## 2017-01-01 RX ADMIN — HUMAN INSULIN 10 UNITS: 100 INJECTION, SOLUTION SUBCUTANEOUS at 23:54

## 2017-01-01 RX ADMIN — SODIUM CHLORIDE 1902 ML: 9 INJECTION, SOLUTION INTRAVENOUS at 23:57

## 2017-01-01 RX ADMIN — CEFEPIME HYDROCHLORIDE 1 G: 1 INJECTION, POWDER, FOR SOLUTION INTRAMUSCULAR; INTRAVENOUS at 01:13

## 2017-01-01 RX ADMIN — DIPHENHYDRAMINE HYDROCHLORIDE 25 MG: 25 CAPSULE ORAL at 15:02

## 2017-01-01 RX ADMIN — Medication 2 TABLET: at 09:36

## 2017-01-01 RX ADMIN — LORAZEPAM 0.5 MG: 2 INJECTION INTRAMUSCULAR; INTRAVENOUS at 04:44

## 2017-01-01 RX ADMIN — SODIUM CHLORIDE, PRESERVATIVE FREE 500 UNITS: 5 INJECTION INTRAVENOUS at 10:25

## 2017-01-01 RX ADMIN — HEPARIN SODIUM 5000 UNITS: 5000 INJECTION, SOLUTION INTRAVENOUS; SUBCUTANEOUS at 07:24

## 2017-01-01 RX ADMIN — HYDROCORTISONE SODIUM SUCCINATE 50 MG: 100 INJECTION, POWDER, FOR SOLUTION INTRAMUSCULAR; INTRAVENOUS at 15:02

## 2017-01-01 RX ADMIN — PANTOPRAZOLE SODIUM 40 MG: 40 TABLET, DELAYED RELEASE ORAL at 05:37

## 2017-01-01 RX ADMIN — SODIUM CHLORIDE 250 ML: 900 INJECTION, SOLUTION INTRAVENOUS at 08:29

## 2017-01-01 RX ADMIN — GADOBENATE DIMEGLUMINE 12 ML: 529 INJECTION, SOLUTION INTRAVENOUS at 22:30

## 2017-01-01 RX ADMIN — GLYCOPYRROLATE 0.2 MG: 0.2 INJECTION, SOLUTION INTRAMUSCULAR; INTRAVENOUS at 04:44

## 2017-01-01 RX ADMIN — ONDANSETRON 4 MG: 2 INJECTION INTRAMUSCULAR; INTRAVENOUS at 01:42

## 2017-01-01 RX ADMIN — SUCRALFATE 1 G: 1 TABLET ORAL at 08:24

## 2017-01-01 RX ADMIN — Medication 10 ML: at 11:05

## 2017-01-01 RX ADMIN — FENTANYL CITRATE 25 MCG: 50 INJECTION INTRAMUSCULAR; INTRAVENOUS at 12:49

## 2017-01-01 RX ADMIN — GLYCOPYRROLATE 0.4 MG: 0.2 INJECTION, SOLUTION INTRAMUSCULAR; INTRAVENOUS at 15:45

## 2017-01-01 RX ADMIN — SUCRALFATE 1 G: 1 TABLET ORAL at 09:19

## 2017-01-01 RX ADMIN — DIATRIZOATE MEGLUMINE AND DIATRIZOATE SODIUM 30 ML: 660; 100 LIQUID ORAL; RECTAL at 08:15

## 2017-01-01 RX ADMIN — LORAZEPAM 0.5 MG: 2 INJECTION INTRAMUSCULAR; INTRAVENOUS at 07:53

## 2017-01-01 RX ADMIN — FENTANYL CITRATE 75 MCG: 50 INJECTION INTRAMUSCULAR; INTRAVENOUS at 10:43

## 2017-01-01 RX ADMIN — SODIUM CHLORIDE 125 ML/HR: 9 INJECTION, SOLUTION INTRAVENOUS at 12:09

## 2017-01-01 RX ADMIN — SUCRALFATE 1 G: 1 TABLET ORAL at 12:05

## 2017-01-01 RX ADMIN — SODIUM CHLORIDE 125 ML/HR: 9 INJECTION, SOLUTION INTRAVENOUS at 01:41

## 2017-01-01 RX ADMIN — FENTANYL CITRATE 75 MCG: 50 INJECTION INTRAMUSCULAR; INTRAVENOUS at 16:16

## 2017-01-01 RX ADMIN — PROCHLORPERAZINE EDISYLATE 10 MG: 5 INJECTION INTRAMUSCULAR; INTRAVENOUS at 01:52

## 2017-01-01 RX ADMIN — SUCRALFATE 1 G: 1 TABLET ORAL at 20:23

## 2017-01-01 RX ADMIN — HYDROCODONE BITARTRATE AND ACETAMINOPHEN 1 TABLET: 5; 325 TABLET ORAL at 19:19

## 2017-01-01 RX ADMIN — Medication 2 G: at 10:43

## 2017-01-01 RX ADMIN — LORAZEPAM 0.5 MG: 2 INJECTION INTRAMUSCULAR; INTRAVENOUS at 09:07

## 2017-01-01 RX ADMIN — MORPHINE SULFATE 4 MG: 4 INJECTION, SOLUTION INTRAMUSCULAR; INTRAVENOUS at 17:20

## 2017-01-01 RX ADMIN — MORPHINE SULFATE 4 MG: 4 INJECTION, SOLUTION INTRAMUSCULAR; INTRAVENOUS at 09:07

## 2017-01-01 RX ADMIN — PROPOFOL 50 MG: 10 INJECTION, EMULSION INTRAVENOUS at 12:55

## 2017-01-01 RX ADMIN — FENTANYL CITRATE 75 MCG: 50 INJECTION INTRAMUSCULAR; INTRAVENOUS at 07:30

## 2017-01-01 RX ADMIN — Medication 500 UNITS: at 16:55

## 2017-01-01 RX ADMIN — MAGNESIUM SULFATE HEPTAHYDRATE 2 G: 1 INJECTION, SOLUTION INTRAVENOUS at 22:20

## 2017-01-01 RX ADMIN — LORAZEPAM 1 MG: 2 INJECTION, SOLUTION INTRAMUSCULAR; INTRAVENOUS at 15:45

## 2017-01-01 RX ADMIN — MORPHINE SULFATE 4 MG: 4 INJECTION, SOLUTION INTRAMUSCULAR; INTRAVENOUS at 22:15

## 2017-01-01 RX ADMIN — Medication 10 ML: at 10:24

## 2017-01-01 RX ADMIN — LEVOFLOXACIN 500 MG: 5 INJECTION, SOLUTION INTRAVENOUS at 01:08

## 2017-01-01 RX ADMIN — SODIUM CHLORIDE 125 ML/HR: 9 INJECTION, SOLUTION INTRAVENOUS at 00:48

## 2017-01-01 RX ADMIN — GLYCOPYRROLATE 0.2 MG: 0.2 INJECTION, SOLUTION INTRAMUSCULAR; INTRAVENOUS at 09:07

## 2017-01-01 RX ADMIN — SODIUM CHLORIDE 1000 MG: 900 INJECTION, SOLUTION INTRAVENOUS at 09:49

## 2017-01-01 RX ADMIN — LORAZEPAM 2 MG: 2 INJECTION, SOLUTION INTRAMUSCULAR; INTRAVENOUS at 02:07

## 2017-01-01 RX ADMIN — THIAMINE HYDROCHLORIDE 100 MG: 100 INJECTION, SOLUTION INTRAMUSCULAR; INTRAVENOUS at 00:40

## 2017-01-01 RX ADMIN — SUCRALFATE 1 G: 1 TABLET ORAL at 18:50

## 2017-01-01 RX ADMIN — MORPHINE SULFATE 4 MG: 4 INJECTION, SOLUTION INTRAMUSCULAR; INTRAVENOUS at 00:39

## 2017-01-01 RX ADMIN — CEFEPIME HYDROCHLORIDE 1 G: 1 INJECTION, POWDER, FOR SOLUTION INTRAMUSCULAR; INTRAVENOUS at 23:45

## 2017-01-01 RX ADMIN — SODIUM CHLORIDE 1000 ML: 9 INJECTION, SOLUTION INTRAVENOUS at 10:46

## 2017-01-01 RX ADMIN — LORAZEPAM 0.5 MG: 2 INJECTION INTRAMUSCULAR; INTRAVENOUS at 20:44

## 2017-01-01 RX ADMIN — LORAZEPAM 0.5 MG: 2 INJECTION INTRAMUSCULAR; INTRAVENOUS at 22:56

## 2017-01-12 NOTE — TELEPHONE ENCOUNTER
----- Message from Brenda Grant RN sent at 1/12/2017 10:12 AM EST -----  Please add pt to Lab Port Chair

## 2017-01-12 NOTE — PROGRESS NOTES
"History:     Reason for follow up:   1. Stage IV, T3N0M1 cholangiocarcinoma.   2. Hospital follow up     HPI:  Charlee Barrera presents for follow-up of cholangiocarcinoma and hospital follow up for severe nausea and vomiting. Since I last saw the patient, she was admitted after FOLFIRI with klebsiella sepsis, nausea/vomiting thought to be a result of multiple gastric ulcers. Initial concern was for possible obstruction, but endoscopy showed four ulcers without evidence of obstruction. The biliary stents were seen in the ampulla. She was started on protonix and really has done very well. She's gained weight since discharge and has been able to expand her diet. No pain, nausea or vomiting. We have previously discussed not resuming FOLFIRI due to intolerance, and patient notes that desire again today to not resume FOLFIRI. We had discussed doing single agent Gemzar and I think that's appropriate. She is ambulating well and has great family support.     Past Medical   Past Medical History   Diagnosis Date   • Anxiety    • Arthritis      \"bad right knee\"   • Bile duct cancer      Metastatic cholangiocarcinoma    • Broken arm      LEFT   • Cancer    • Diabetes mellitus    • Diarrhea due to drug    • History of transfusion    • Hypertension    • PONV (postoperative nausea and vomiting)     and   Patient Active Problem List   Diagnosis   • Cholangiocarcinoma   • Hyperbilirubinemia   • Type 2 diabetes mellitus without complication   • Hyperglycemia   • Cancer related pain   • Fever and chills   • Chemotherapy induced thrombocytopenia   • Anemia associated with chemotherapy   • Hypomagnesemia   • Hypokalemia   • Closed fracture of left olecranon process   • Wrist swelling   • Closed fracture of humerus   • Encounter for adjustment or management of vascular access device   • Bile duct obstruction   • Neutropenic fever   • Weakness   • Generalized weakness   • Emesis, persistent     Social History   Social History     Social " History   • Marital status:      Spouse name: Carlitos   • Number of children: N/A   • Years of education: High School     Occupational History   • 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 Reports she drinks about 1.2 0 1.8 oz of alcohol per week.      Family History  Family History   Problem Relation Age of Onset   • Heart disease Father    • Hypertension Mother      Allergies  Allergies   Allergen Reactions   • Morphine And Related Confusion   • Sulfa Antibiotics      Patient cannot remember reaction.   • Oxaliplatin Palpitations, Other (See Comments) and Angioedema     Severe flushing for head, neck and palms.  Requiring iv benadryl, iv pepcid and iv steroids (solucortef) to reverse       Medications: The current medication list was reviewed in the EMR.    Review of Systems  Review of Systems   Constitutional: Negative for appetite change, chills, diaphoresis and fever.   HENT: Negative for congestion, ear pain, sinus pressure, tinnitus and trouble swallowing.    Eyes: Negative for discharge, redness, itching and visual disturbance.   Respiratory: Negative for cough, chest tightness and shortness of breath.    Cardiovascular: Negative for chest pain and palpitations.   Gastrointestinal: Negative for abdominal distention, abdominal pain, blood in stool, diarrhea, nausea and vomiting.   Endocrine: Negative for cold intolerance, polydipsia and polyuria.   Genitourinary: Negative for difficulty urinating, dysuria and pelvic pain.   Musculoskeletal: Negative for joint swelling and myalgias.   Skin: Negative for color change, pallor, rash and wound.   Neurological:  "Negative for dizziness, seizures and weakness.   Hematological: Negative for adenopathy. Does not bruise/bleed easily.   Psychiatric/Behavioral: Negative for agitation and behavioral problems.       Objective:     Vitals:    01/12/17 0940   BP: 112/62   Pulse: 95   Resp: 16   Temp: 97.5 °F (36.4 °C)   TempSrc: Oral   SpO2: 99%   Weight: 120 lb 9.6 oz (54.7 kg)   Height: 62\" (157.5 cm)   PainSc: 0-No pain     Current Status 1/12/2017   ECOG score 1     GENERAL:  Well-developed, well-nourished female in no acute distress. Appearing younger than stated age.  SKIN:  Warm, dry without rashes, purpura or petechiae.  HEAD:  Normocephalic.  EYES:  Pupils equal, round and reactive to light.  EOMs intact.  Conjunctivae normal.  NECK:  Supple with good range of motion; no thyromegaly or masses  LYMPHATICS:  No cervical, supraclavicular, axillary adenopathy.  CHEST:  Lungs clear to percussion and auscultation. Good airflow.  CARDIAC:  Regular rate and rhythm without murmurs, rubs or gallops. Normal S1,S2.  ABDOMEN:  Soft, nontender, no hepatosplenomegaly, normal bowel sounds  EXTREMITIES:  No clubbing, cyanosis; trace bilateral lower extremity edema.  PSYCHIATRIC:  Normal affect and mood.      Labs and Imaging  Results for orders placed or performed in visit on 01/12/17   Comprehensive metabolic panel   Result Value Ref Range    Glucose 186 (H) 74 - 124 mg/dL    BUN 13 6 - 20 mg/dL    Creatinine 0.44 (L) 0.60 - 1.10 mg/dL    Sodium 130 (L) 134 - 145 mmol/L    Potassium 4.2 3.5 - 4.7 mmol/L    Chloride 96 (L) 98 - 107 mmol/L    CO2 23.6 22.0 - 29.0 mmol/L    Calcium 8.6 8.5 - 10.2 mg/dL    Total Protein 7.1 6.3 - 8.0 g/dL    Albumin 2.70 (L) 3.50 - 5.20 g/dL    ALT (SGPT) 10 0 - 33 U/L    AST (SGOT) 20 0 - 32 U/L    Alkaline Phosphatase 216 (H) 38 - 116 U/L    Total Bilirubin 1.5 (H) 0.1 - 1.2 mg/dL    eGFR Non African Amer 138 >60 mL/min/1.73    Globulin 4.4 (H) 1.8 - 3.5 gm/dL    A/G Ratio 0.6 (L) 1.1 - 2.4 g/dL    " BUN/Creatinine Ratio 29.5 7.3 - 30.0    Anion Gap 10.4 mmol/L   Magnesium   Result Value Ref Range    Magnesium 1.5 (C) 1.8 - 2.5 mg/dL   CBC Auto Differential   Result Value Ref Range    WBC 9.22 4.00 - 10.00 10*3/mm3    RBC 3.13 (L) 3.90 - 5.00 10*6/mm3    Hemoglobin 9.7 (L) 11.5 - 14.9 g/dL    Hematocrit 30.0 (L) 34.0 - 45.0 %    MCV 95.8 83.0 - 97.0 fL    MCH 31.0 27.0 - 33.0 pg    MCHC 32.3 32.0 - 35.0 g/dL    RDW 15.8 (H) 11.7 - 14.5 %    RDW-SD 55.5 (H) 37.0 - 49.0 fl    MPV 10.3 8.9 - 12.1 fL    Platelets 165 150 - 375 10*3/mm3    Neutrophil % 84.6 (H) 39.0 - 75.0 %    Lymphocyte % 7.6 (L) 20.0 - 49.0 %    Monocyte % 6.0 4.0 - 12.0 %    Eosinophil % 0.9 (L) 1.0 - 5.0 %    Basophil % 0.2 0.0 - 1.1 %    Immature Grans % 0.7 (H) 0.0 - 0.5 %    Neutrophils, Absolute 7.81 (H) 1.50 - 7.00 10*3/mm3    Lymphocytes, Absolute 0.70 (L) 1.00 - 3.50 10*3/mm3    Monocytes, Absolute 0.55 0.25 - 0.80 10*3/mm3    Eosinophils, Absolute 0.08 0.00 - 0.36 10*3/mm3    Basophils, Absolute 0.02 0.00 - 0.10 10*3/mm3    Immature Grans, Absolute 0.06 (H) 0.00 - 0.03 10*3/mm3    nRBC 0.0 0.0 - 0.0 /100 WBC       Assessment/Plan   Assessment/Plan:   1. Stage IV, T3N0M1 extrahepatic cholangiocarcinoma.    - palliative [place]/Ox q 2 weeks started 3/14/16. Had to discontinue due to reaction to Oxaliplatin. Also received FOLFIRI, but had intolerable side effects.    - She's improved since her last admission and is interested in resuming single agent Gemzar. We discussed side effects and possible benefits today. Goals are palliative and patient fully understands. If side effects are too great, then she doesn't want to continue.    - Plan to start at 800 mg/m2 due to elevated bilirubin, but if she tolerates well, we can consider increasing to 1000 mg/m2. Planning on three weeks on, one week off.    - Will not plan on repeating imaging now but will scan after six doses of Gemzar, understanding that while interpreting our scans we need to consider  the time between her last scans and the start of therapy.   2. Nausea and vomiting. Improved with treatment for gastric ulcers. Monitor.   3. Biliary obstruction. Improved status post ERCP (early August) with removal of calculi and debris with dramatic improvement in symptoms. Monitor bilirubin.  4. Cancer related pain.   - Norco 5/325 mg every 4 hours prn.  5. Hyponatremia. Likely related to tumor/po intake. Monitor. She's asymptomatic.      I need to make sure to address her code status at future visits.   Start Gemzar next week and I'll see her back for day 8 Gemzar.  I asked the patient to call for any questions, concerns, or new symptoms.

## 2017-01-26 NOTE — PROGRESS NOTES
"History:     Reason for follow up:   1. Stage IV, T3N0M1 cholangiocarcinoma.        HPI:  Charlee Barrera presents for follow-up of cholangiocarcinoma and day 8 Gemzar. She's feeling well. She is eating well and has gained two pounds. No bleeding.     Past Medical   Past Medical History   Diagnosis Date   • Anxiety    • Arthritis      \"bad right knee\"   • Bile duct cancer      Metastatic cholangiocarcinoma    • Broken arm      LEFT   • Cancer    • Diabetes mellitus    • Diarrhea due to drug    • History of transfusion    • Hypertension    • PONV (postoperative nausea and vomiting)     and   Patient Active Problem List   Diagnosis   • Cholangiocarcinoma   • Hyperbilirubinemia   • Type 2 diabetes mellitus without complication   • Hyperglycemia   • Cancer related pain   • Fever and chills   • Chemotherapy induced thrombocytopenia   • Anemia associated with chemotherapy   • Hypomagnesemia   • Hypokalemia   • Closed fracture of left olecranon process   • Wrist swelling   • Closed fracture of humerus   • Encounter for adjustment or management of vascular access device   • Bile duct obstruction   • Neutropenic fever   • Weakness   • Generalized weakness   • Emesis, persistent     Social History   Social History     Social History   • Marital status:      Spouse name: Carlitos   • Number of children: N/A   • Years of education: High School     Occupational History   • 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 Reports she drinks about 1.2 0 1.8 oz of alcohol per week.      Family History  Family History   Problem Relation Age of Onset   • Heart disease Father    • " "Hypertension Mother      Allergies  Allergies   Allergen Reactions   • Morphine And Related Confusion   • Sulfa Antibiotics      Patient cannot remember reaction.   • Oxaliplatin Palpitations, Other (See Comments) and Angioedema     Severe flushing for head, neck and palms.  Requiring iv benadryl, iv pepcid and iv steroids (solucortef) to reverse       Medications: The current medication list was reviewed in the EMR.    Review of Systems  Review of Systems   Constitutional: Positive for fatigue. Negative for appetite change, chills, diaphoresis and fever.   HENT: Negative for congestion, ear pain, sinus pressure, tinnitus and trouble swallowing.    Eyes: Negative for discharge, redness, itching and visual disturbance.   Respiratory: Negative for cough, chest tightness and shortness of breath.    Cardiovascular: Negative for chest pain and palpitations.   Gastrointestinal: Negative for abdominal distention, abdominal pain, blood in stool, diarrhea, nausea and vomiting.   Endocrine: Negative for cold intolerance, polydipsia and polyuria.   Genitourinary: Negative for difficulty urinating, dysuria and pelvic pain.   Musculoskeletal: Negative for joint swelling and myalgias.   Skin: Negative for color change, pallor, rash and wound.   Neurological: Negative for dizziness, seizures and weakness.   Hematological: Negative for adenopathy. Does not bruise/bleed easily.   Psychiatric/Behavioral: Negative for agitation and behavioral problems.       Objective:     Vitals:    01/26/17 0909   BP: 112/62   Pulse: 82   Resp: 12   Temp: 98.3 °F (36.8 °C)   TempSrc: Oral   Weight: 121 lb 1.6 oz (54.9 kg)   Height: 62.99\" (160 cm)   PainSc: 0-No pain     Current Status 1/12/2017   ECOG score 1   GENERAL:  Thin female in no acute distress.   SKIN:  Warm, dry without rashes, purpura or petechiae.  CHEST:  Lungs clear to percussion and auscultation. Good airflow.  CARDIAC:  Regular rate and rhythm without murmurs, rubs or gallops. " Normal S1,S2. No edema  EXTREMITIES:  No clubbing, cyanosis or edema.  PSYCHIATRIC:  Normal affect and mood.      Labs and Imaging  Results for orders placed or performed in visit on 01/26/17   CBC Auto Differential   Result Value Ref Range    WBC 7.24 4.00 - 10.00 10*3/mm3    RBC 3.16 (L) 3.90 - 5.00 10*6/mm3    Hemoglobin 10.0 (L) 11.5 - 14.9 g/dL    Hematocrit 28.9 (L) 34.0 - 45.0 %    MCV 91.5 83.0 - 97.0 fL    MCH 31.6 27.0 - 33.0 pg    MCHC 34.6 32.0 - 35.0 g/dL    RDW 14.3 11.7 - 14.5 %    RDW-SD 48.8 37.0 - 49.0 fl    MPV 10.0 8.9 - 12.1 fL    Platelets 85 (L) 150 - 375 10*3/mm3    Neutrophil % 80.2 (H) 39.0 - 75.0 %    Lymphocyte % 9.0 (L) 20.0 - 49.0 %    Monocyte % 9.9 4.0 - 12.0 %    Eosinophil % 0.0 (L) 1.0 - 5.0 %    Basophil % 0.1 0.0 - 1.1 %    Immature Grans % 0.8 (H) 0.0 - 0.5 %    Neutrophils, Absolute 5.80 1.50 - 7.00 10*3/mm3    Lymphocytes, Absolute 0.65 (L) 1.00 - 3.50 10*3/mm3    Monocytes, Absolute 0.72 0.25 - 0.80 10*3/mm3    Eosinophils, Absolute 0.00 0.00 - 0.36 10*3/mm3    Basophils, Absolute 0.01 0.00 - 0.10 10*3/mm3    Immature Grans, Absolute 0.06 (H) 0.00 - 0.03 10*3/mm3    nRBC 0.0 0.0 - 0.0 /100 WBC       Assessment/Plan   Assessment/Plan:   1. Stage IV, T3N0M1 extrahepatic cholangiocarcinoma.    - palliative [place]/Ox q 2 weeks started 3/14/16. Had to discontinue due to reaction to Oxaliplatin. Also received FOLFIRI, but had intolerable side effects.    - I discussed moving to every other week Gemzar, but patient desires to proceed forward with chemotherapy today thus we'll pull dose down another 20% and plan on doing Gemzar 2 out of every 3 weeks. If able, we'll increase dose in future. She knows to call if any signs of bleeding.    - Plan scan after four-six doses Gemzar  2. Nausea and vomiting. Improved with treatment for gastric ulcers. Monitor.   3. Biliary obstruction. Improved status post ERCP (early August) with removal of calculi and debris with dramatic improvement in  symptoms. Monitor bilirubin.  4. Cancer related pain.   - Norco 5/325 mg every 4 hours prn.  5. Hyponatremia. Likely related to tumor/po intake. Monitor. She's asymptomatic.    6. Thrombocytopenia due to chemotherapy. Adjust dose as above.   I need to make sure to address her code status at future visits.     RTC in two weeks of cycle 2, day 1 Gemzar.  I asked the patient to call for any questions, concerns, or new symptoms.

## 2017-02-09 NOTE — PROGRESS NOTES
"History:     Reason for follow up:   1. Stage IV, T3N0M1 cholangiocarcinoma.        HPI:  Charlee Barrera presents for follow-up of cholangiocarcinoma on Gemzar. We had to adjust her regimen because of thrombocytopenia and are going to try to dose Gemzar two out of every three weeks.  She was tired last week but started feeling better just yesterday. No bleeding. She desires to continue palliative Gemzar. No bleeding. Afebrile. Eating well. No nausea.      Past Medical   Past Medical History   Diagnosis Date   • Anxiety    • Arthritis      \"bad right knee\"   • Bile duct cancer      Metastatic cholangiocarcinoma    • Broken arm      LEFT   • Cancer    • Diabetes mellitus    • Diarrhea due to drug    • History of transfusion    • Hypertension    • PONV (postoperative nausea and vomiting)     and   Patient Active Problem List   Diagnosis   • Cholangiocarcinoma   • Hyperbilirubinemia   • Type 2 diabetes mellitus without complication   • Hyperglycemia   • Cancer related pain   • Fever and chills   • Chemotherapy-induced thrombocytopenia   • Anemia associated with chemotherapy   • Hypomagnesemia   • Hypokalemia   • Closed fracture of left olecranon process   • Wrist swelling   • Closed fracture of humerus   • Encounter for adjustment or management of vascular access device   • Bile duct obstruction   • Neutropenic fever   • Weakness   • Generalized weakness   • Emesis, persistent     Social History   Social History     Social History   • Marital status:      Spouse name: Carlitos   • Number of children: N/A   • Years of education: High School     Occupational History   • 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 Reports she drinks about 1.2 0 1.8 oz of alcohol per week.      Family History  Family History   Problem Relation Age of Onset   • Heart disease Father    • Hypertension Mother      Allergies  Allergies   Allergen Reactions   • Morphine And Related Confusion   • Sulfa Antibiotics      Patient cannot remember reaction.   • Oxaliplatin Palpitations, Other (See Comments) and Angioedema     Severe flushing for head, neck and palms.  Requiring iv benadryl, iv pepcid and iv steroids (solucortef) to reverse       Medications: The current medication list was reviewed in the EMR.    Review of Systems  Review of Systems   Constitutional: Positive for fatigue. Negative for appetite change, chills, diaphoresis and fever.   HENT: Negative for congestion, ear pain, sinus pressure, tinnitus and trouble swallowing.    Eyes: Negative for discharge, redness, itching and visual disturbance.   Respiratory: Negative for cough, chest tightness and shortness of breath.    Cardiovascular: Negative for chest pain and palpitations.   Gastrointestinal: Negative for abdominal distention, abdominal pain, blood in stool, diarrhea, nausea and vomiting.   Endocrine: Negative for cold intolerance, polydipsia and polyuria.   Genitourinary: Negative for difficulty urinating, dysuria and pelvic pain.   Musculoskeletal: Negative for joint swelling and myalgias.   Skin: Negative for color change, pallor, rash and wound.   Neurological: Negative for dizziness, seizures and weakness.   Hematological: Negative for adenopathy. Does not bruise/bleed easily.   Psychiatric/Behavioral: Negative for agitation and behavioral problems.       Objective:     Vitals:    02/09/17 0856   BP: 116/70   Pulse: 104   Resp: 14   Temp: 97.4 °F (36.3 °C)   TempSrc: Oral   SpO2: 97%   Weight: 122 lb 6.4 oz (55.5 kg)   Height: 62.99\" (160 cm)   PainSc: 0-No pain     Current Status 2/9/2017   ECOG score 1   Physical Exam "   Constitutional: She is oriented to person, place, and time and well-developed, well-nourished, and in no distress. No distress.   thin   HENT:   Head: Normocephalic and atraumatic.   Nose: Nose normal.   Mouth/Throat: Oropharynx is clear and moist. No oropharyngeal exudate.   Eyes: Conjunctivae and EOM are normal. No scleral icterus.   Neck: Normal range of motion. Neck supple. No thyromegaly present.   Cardiovascular: Normal rate, regular rhythm and normal heart sounds.  Exam reveals no friction rub.    No murmur heard.  Pulmonary/Chest: Effort normal and breath sounds normal. No respiratory distress. She has no wheezes. She has no rales.   Port in place; benign appearing   Abdominal: Soft. Bowel sounds are normal. She exhibits no distension and no mass. There is no tenderness.   Musculoskeletal: Normal range of motion. She exhibits no edema or tenderness.   Lymphadenopathy:     She has no cervical adenopathy.   Neurological: She is alert and oriented to person, place, and time.   Skin: Skin is warm and dry. No rash noted. She is not diaphoretic. No erythema.   Psychiatric: Mood, memory, affect and judgment normal.   Vitals reviewed.    Labs and Imaging  Results for orders placed or performed in visit on 02/09/17   CBC Auto Differential   Result Value Ref Range    WBC 6.90 4.00 - 10.00 10*3/mm3    RBC 2.83 (L) 3.90 - 5.00 10*6/mm3    Hemoglobin 9.2 (L) 11.5 - 14.9 g/dL    Hematocrit 27.4 (L) 34.0 - 45.0 %    MCV 96.8 83.0 - 97.0 fL    MCH 32.5 27.0 - 33.0 pg    MCHC 33.6 32.0 - 35.0 g/dL    RDW 17.3 (H) 11.7 - 14.5 %    RDW-SD 57.7 (H) 37.0 - 49.0 fl    MPV 10.1 8.9 - 12.1 fL    Platelets 252 150 - 375 10*3/mm3    Neutrophil % 71.5 39.0 - 75.0 %    Lymphocyte % 9.9 (L) 20.0 - 49.0 %    Monocyte % 13.5 (H) 4.0 - 12.0 %    Eosinophil % 0.6 (L) 1.0 - 5.0 %    Basophil % 0.6 0.0 - 1.1 %    Immature Grans % 3.9 (H) 0.0 - 0.5 %    Neutrophils, Absolute 4.94 1.50 - 7.00 10*3/mm3    Lymphocytes, Absolute 0.68 (L) 1.00 -  3.50 10*3/mm3    Monocytes, Absolute 0.93 (H) 0.25 - 0.80 10*3/mm3    Eosinophils, Absolute 0.04 0.00 - 0.36 10*3/mm3    Basophils, Absolute 0.04 0.00 - 0.10 10*3/mm3    Immature Grans, Absolute 0.27 (H) 0.00 - 0.03 10*3/mm3    nRBC 0.0 0.0 - 0.0 /100 WBC       Assessment/Plan   Assessment/Plan:   1. Stage IV, T3N0M1 extrahepatic cholangiocarcinoma.    - palliative [place]/Ox q 2 weeks started 3/14/16. Had to discontinue due to reaction to Oxaliplatin. Also received FOLFIRI, but had intolerable side effects.    - Continue Gemzar 2 of every 3 weeks at reduced dose. Consider increasing dose in 3 weeks if bilirubin better and platelets look like they can tolerate.    - Will order scans at next MD visit.    2. Nausea and vomiting. Improved with treatment for gastric ulcers. Monitor. Still doing well.   3. Biliary obstruction. Improved status post ERCP (early August) with removal of calculi and debris with dramatic improvement in symptoms. Monitoring bilirubin.  4. Cancer related pain.   - Norco 5/325 mg every 4 hours prn. Improved.   5. Hyponatremia. Likely related to tumor/po intake. Monitor. She's asymptomatic.  Follow up labs from today   6. Thrombocytopenia due to chemotherapy. Reduced dose and altered schedule.     RTC in 1 week for cycle 2 day 8 Gemzar with NP. MD visit in 3 weeks for cycle 3.  I asked the patient to call for any questions, concerns, or new symptoms.

## 2017-02-16 NOTE — PROGRESS NOTES
"History:     Reason for follow up:   1. Stage IV, T3N0M1 cholangiocarcinoma.        HPI:  Charlee Barrera presents for follow-up of cholangiocarcinoma on Gemzar, accompanied by her son.  Recently, her Gemzar schedule had to be adjusted for thrombocytopenia and mild hyperbilirubinemia to two out of every three weeks.  Unfortunately, her bilirubin has actually worsened from 1.4 to1.8 today.  Also, she continues with chemotherapy induced thrombocytopenia with a profound decrease in platelets from 252,000 last week to 87,000 today. Thankfully, she denies any bleeding or excessive bruising.  She denies nausea, vomiting, constipation.  She did note a few episodes of diarrhea, however this is well controlled with Imodium.  She still feels a little fatigued today, however notes more energy than she had last week.  She denies pain.  She did note an episode of chills the day following her last chemotherapy, however this was without fever and has since resolved.  She is experiencing early satiety and a diminished appetite.  She is attempting to eat small, frequent meals and is supplementing with boost.  Hydration is adequate.  She denies any other concerns today.  Past Medical History   Diagnosis Date   • Anxiety    • Arthritis      \"bad right knee\"   • Bile duct cancer      Metastatic cholangiocarcinoma    • Broken arm      LEFT   • Cancer    • Diabetes mellitus    • Diarrhea due to drug    • History of transfusion    • Hypertension    • PONV (postoperative nausea and vomiting)     and   Patient Active Problem List   Diagnosis   • Cholangiocarcinoma   • Hyperbilirubinemia   • Type 2 diabetes mellitus without complication   • Hyperglycemia   • Cancer related pain   • Fever and chills   • Chemotherapy-induced thrombocytopenia   • Anemia associated with chemotherapy   • Hypomagnesemia   • Hypokalemia   • Closed fracture of left olecranon process   • Wrist swelling   • Closed fracture of humerus   • Encounter for adjustment or " management of vascular access device   • Bile duct obstruction   • Neutropenic fever   • Weakness   • Generalized weakness   • Emesis, persistent     Social History   Social History     Social History   • Marital status:      Spouse name: Carlitos   • Number of children: N/A   • Years of education: High School     Occupational History   • Housewife Retired     Clothing  for [employer]     Social History Main Topics   • Smoking status: Never Smoker   • Smokeless tobacco: Never Used   • Alcohol use 1.2 - 1.8 oz/week     2 - 3 Glasses of wine per week      Comment: weekly   • Drug use: No   • Sexual activity: Not Currently     Other Topics Concern   • Not on file     Social History Narrative    Patient has never smoked. Reports she drinks about 1.2 0 1.8 oz of alcohol per week.      Family History  Family History   Problem Relation Age of Onset   • Heart disease Father    • Hypertension Mother      Allergies  Allergies   Allergen Reactions   • Morphine And Related Confusion   • Sulfa Antibiotics      Patient cannot remember reaction.   • Oxaliplatin Palpitations, Other (See Comments) and Angioedema     Severe flushing for head, neck and palms.  Requiring iv benadryl, iv pepcid and iv steroids (solucortef) to reverse       Medications: The current medication list was reviewed in the EMR.    Review of Systems  Review of Systems   Constitutional: Positive for appetite change (early satiety ), chills (See HPI) and fatigue. Negative for diaphoresis and fever. Activity change: early satiety.   HENT: Negative for congestion, ear pain, sinus pressure, tinnitus and trouble swallowing.    Eyes: Negative for discharge, redness, itching and visual disturbance.   Respiratory: Negative for cough, chest tightness and shortness of breath.    Cardiovascular: Positive for leg swelling (chronic, bilateral 2+ edema). Negative for chest pain and palpitations.   Gastrointestinal: Negative for abdominal distention, abdominal  "pain, blood in stool, diarrhea, nausea and vomiting.   Endocrine: Negative for cold intolerance, polydipsia and polyuria.   Genitourinary: Negative for difficulty urinating, dysuria and pelvic pain.   Musculoskeletal: Negative for joint swelling and myalgias.   Skin: Negative for color change, pallor, rash and wound.   Neurological: Negative for dizziness, seizures and weakness.   Hematological: Negative for adenopathy. Does not bruise/bleed easily.   Psychiatric/Behavioral: Negative for agitation and behavioral problems.       Objective:     Vitals:    02/16/17 0953   BP: 104/70   Pulse: 104   Resp: 12   Temp: 97.5 °F (36.4 °C)   TempSrc: Oral   SpO2: 100%   Weight: 123 lb 1.6 oz (55.8 kg)   Height: 62.99\" (160 cm)   PainSc: 0-No pain     Current Status 2/16/2017   ECOG score 1   Physical Exam   Constitutional: She is oriented to person, place, and time and well-developed, well-nourished, and in no distress. No distress.   thin   HENT:   Head: Normocephalic and atraumatic.   Nose: Nose normal.   Mouth/Throat: Oropharynx is clear and moist. No oropharyngeal exudate.   Eyes: Conjunctivae and EOM are normal. No scleral icterus.   Neck: Normal range of motion. Neck supple. No thyromegaly present.   Cardiovascular: Normal rate, regular rhythm and normal heart sounds.  Exam reveals no friction rub.    No murmur heard.  Pulmonary/Chest: Effort normal and breath sounds normal. No respiratory distress. She has no wheezes. She has no rales.   Port in place; benign appearing   Abdominal: Soft. Bowel sounds are normal. She exhibits no distension and no mass. There is no tenderness.   Musculoskeletal: Normal range of motion. She exhibits no edema or tenderness.   Lymphadenopathy:     She has no cervical adenopathy.   Neurological: She is alert and oriented to person, place, and time.   Skin: Skin is warm and dry. No rash noted. She is not diaphoretic. No erythema.   Psychiatric: Mood, memory, affect and judgment normal. "   Vitals reviewed.    Labs and Imaging  Results for orders placed or performed in visit on 02/16/17   Comprehensive metabolic panel   Result Value Ref Range    Glucose 188 (H) 74 - 124 mg/dL    BUN 18 6 - 20 mg/dL    Creatinine 0.64 0.60 - 1.10 mg/dL    Sodium 131 (L) 134 - 145 mmol/L    Potassium 4.6 3.5 - 4.7 mmol/L    Chloride 95 (L) 98 - 107 mmol/L    CO2 26.6 22.0 - 29.0 mmol/L    Calcium 8.7 8.5 - 10.2 mg/dL    Total Protein 6.5 6.3 - 8.0 g/dL    Albumin 2.30 (L) 3.50 - 5.20 g/dL    ALT (SGPT) 21 0 - 33 U/L    AST (SGOT) 40 (H) 0 - 32 U/L    Alkaline Phosphatase 204 (H) 38 - 116 U/L    Total Bilirubin 1.8 (H) 0.1 - 1.2 mg/dL    eGFR Non African Amer 89 >60 mL/min/1.73    Globulin 4.2 (H) 1.8 - 3.5 gm/dL    A/G Ratio 0.5 (L) 1.1 - 2.4 g/dL    BUN/Creatinine Ratio 28.1 7.3 - 30.0    Anion Gap 9.4 mmol/L   CBC Auto Differential   Result Value Ref Range    WBC 12.28 (H) 4.00 - 10.00 10*3/mm3    RBC 2.80 (L) 3.90 - 5.00 10*6/mm3    Hemoglobin 9.2 (L) 11.5 - 14.9 g/dL    Hematocrit 26.8 (L) 34.0 - 45.0 %    MCV 95.7 83.0 - 97.0 fL    MCH 32.9 27.0 - 33.0 pg    MCHC 34.3 32.0 - 35.0 g/dL    RDW 17.7 (H) 11.7 - 14.5 %    RDW-SD 60.9 (H) 37.0 - 49.0 fl    MPV 10.6 8.9 - 12.1 fL    Platelets 87 (L) 150 - 375 10*3/mm3    Neutrophil % 71.5 39.0 - 75.0 %    Lymphocyte % 7.2 (L) 20.0 - 49.0 %    Monocyte % 10.8 4.0 - 12.0 %    Eosinophil % 0.0 (L) 1.0 - 5.0 %    Basophil % 0.7 0.0 - 1.1 %    Immature Grans % 9.8 (H) 0.0 - 0.5 %    Neutrophils, Absolute 8.77 (H) 1.50 - 7.00 10*3/mm3    Lymphocytes, Absolute 0.89 (L) 1.00 - 3.50 10*3/mm3    Monocytes, Absolute 1.33 (H) 0.25 - 0.80 10*3/mm3    Eosinophils, Absolute 0.00 0.00 - 0.36 10*3/mm3    Basophils, Absolute 0.09 0.00 - 0.10 10*3/mm3    Immature Grans, Absolute 1.20 (H) 0.00 - 0.03 10*3/mm3    nRBC 0.2 (H) 0.0 - 0.0 /100 WBC       Assessment/Plan   Assessment/Plan:   1. Stage IV, T3N0M1 extrahepatic cholangiocarcinoma.    - palliative [place]/Ox q 2 weeks started 3/14/16. Had  to discontinue due to reaction to Oxaliplatin. Also received FOLFIRI, but had intolerable side effects.    -  She is presently on Gemzar 2 of every 3 weeks at reduced dose.  She's had a substantial decline in platelets from 252,000 to 87,000 today, without excessive bleeding or bruising.  Her bilirubin has also increased from 1.4-1.8 today.  This was reviewed with Dr. Min and decision was made to hold treatment today.  She will see Dr. Childs in one week to make further treatment decisions and scans will be ordered at that time.   2. Nausea and vomiting. Improved with treatment for gastric ulcers. Monitor. Stable today.  3. Biliary obstruction. Improved status post ERCP (early August) with removal of calculi and debris with dramatic improvement in symptoms.  Bilirubin has decreased over the last several weeks, and is 1.8 today.   4. Cancer related pain.   - Norco 5/325 mg every 4 hours prn.  She has no pain today.  5. Hyponatremia. Likely related to tumor/po intake. Monitor. She remains asymptomatic.  I've encouraged a salt rich diet.    6. Thrombocytopenia due to chemotherapy.  Platelets today are 87,000.  We will hold day cycle 2, day 8 of Gemzar therapy.     PLAN:  1.  Hold cycle 2, day 8 of Gemzar therapy today.  2.  See Dr. Childs and one week on February 23, 2017.  Further treatment decisions and scans will be ordered at that time.  3.  Patient encouraged to consume a salt rich diet for hyponatremia.  4.  Patient advised to call our office with any excessive bleeding or bruising, new pain, or any additional issues of concern prior to her next office visit with Dr. Childs.

## 2017-02-23 NOTE — PROGRESS NOTES
"History:     Reason for follow up:   1. Stage IV, T3N0M1 cholangiocarcinoma.        HPI:  Charlee Barrera presents for follow-up of cholangiocarcinoma on Gemzar, accompanied by her daughter. She's feeling well today. Her chemo was held last week for thrombocytopenia. No new complaints. Her daughter feels like she's not eating much but patient notes that she's at least maintaining her weight and adding supplemental shakes in to her regimen. She just doesn't always have a taste for food. Her leg swelling is improved. No abdominal pain, nausea. Continues to ambulate with a walker. Has exceptional family support.     Past Medical History   Diagnosis Date   • Anxiety    • Arthritis      \"bad right knee\"   • Bile duct cancer      Metastatic cholangiocarcinoma    • Broken arm      LEFT   • Cancer    • Diabetes mellitus    • Diarrhea due to drug    • History of transfusion    • Hypertension    • PONV (postoperative nausea and vomiting)     and   Patient Active Problem List   Diagnosis   • Cholangiocarcinoma   • Hyperbilirubinemia   • Type 2 diabetes mellitus without complication   • Hyperglycemia   • Cancer related pain   • Fever and chills   • Chemotherapy-induced thrombocytopenia   • Anemia associated with chemotherapy   • Hypomagnesemia   • Hypokalemia   • Closed fracture of left olecranon process   • Wrist swelling   • Closed fracture of humerus   • Encounter for adjustment or management of vascular access device   • Bile duct obstruction   • Neutropenic fever   • Weakness   • Generalized weakness   • Emesis, persistent     Social History   Social History     Social History   • Marital status:      Spouse name: Carlitos   • Number of children: 4   • Years of education: High School     Occupational History   • 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 Reports she drinks about 1.2 0 1.8 oz of alcohol per week.      Family History  Family History   Problem Relation Age of Onset   • Heart disease Father    • Hypertension Mother      Allergies  Allergies   Allergen Reactions   • Morphine And Related Confusion   • Sulfa Antibiotics      Patient cannot remember reaction.   • Oxaliplatin Palpitations, Other (See Comments) and Angioedema     Severe flushing for head, neck and palms.  Requiring iv benadryl, iv pepcid and iv steroids (solucortef) to reverse       Medications: The current medication list was reviewed in the EMR.    Review of Systems  Review of Systems   Constitutional: Positive for appetite change and fatigue. Negative for chills, diaphoresis and fever. Activity change: early satiety.   HENT: Negative for congestion, ear pain, sinus pressure, tinnitus and trouble swallowing.    Eyes: Negative for discharge, redness, itching and visual disturbance.   Respiratory: Negative for cough, chest tightness and shortness of breath.    Cardiovascular: Positive for leg swelling (chronic). Negative for chest pain and palpitations.   Gastrointestinal: Negative for abdominal distention, abdominal pain, blood in stool, diarrhea, nausea and vomiting.   Endocrine: Negative for cold intolerance, polydipsia and polyuria.   Genitourinary: Negative for difficulty urinating, dysuria and pelvic pain.   Musculoskeletal: Negative for joint swelling and myalgias.   Skin: Negative for color change, pallor, rash and wound.   Neurological: Negative for dizziness, seizures and weakness.   Hematological: Negative for adenopathy. Does not bruise/bleed easily.   Psychiatric/Behavioral: Negative for agitation and behavioral problems.       Objective:     Vitals:    02/23/17 0804   BP: 100/62   Pulse: 104  "  Resp: 14   Temp: 97.5 °F (36.4 °C)   TempSrc: Oral   SpO2: 98%   Weight: 123 lb 6.4 oz (56 kg)   Height: 62.99\" (160 cm)   PainSc: 0-No pain     Current Status 2/23/2017   ECOG score 1   Physical Exam   Constitutional: She is oriented to person, place, and time and well-developed, well-nourished, and in no distress. No distress.   Thin, elderly   HENT:   Head: Normocephalic and atraumatic.   Nose: Nose normal.   Mouth/Throat: Oropharynx is clear and moist. No oropharyngeal exudate.   Eyes: Conjunctivae and EOM are normal. No scleral icterus.   Neck: Normal range of motion. Neck supple. No thyromegaly present.   Cardiovascular: Normal rate, regular rhythm and normal heart sounds.  Exam reveals no friction rub.    No murmur heard.  Pulmonary/Chest: Effort normal and breath sounds normal. No respiratory distress. She has no wheezes. She has no rales.   Port in place and accessed   Abdominal: Soft. Bowel sounds are normal. She exhibits no distension and no mass. There is no tenderness.   Musculoskeletal: Normal range of motion. She exhibits no tenderness. Edema: 1+ bilateral lower extremity.   Ambulates with walker   Lymphadenopathy:     She has no cervical adenopathy.   Neurological: She is alert and oriented to person, place, and time.   Skin: Skin is warm and dry. No rash noted. She is not diaphoretic. No erythema.   Psychiatric: Mood, memory, affect and judgment normal.   Vitals reviewed.    Labs and Imaging  Results for orders placed or performed in visit on 02/23/17   CBC Auto Differential   Result Value Ref Range    WBC 16.02 (H) 4.00 - 10.00 10*3/mm3    RBC 2.61 (L) 3.90 - 5.00 10*6/mm3    Hemoglobin 8.9 (L) 11.5 - 14.9 g/dL    Hematocrit 27.2 (L) 34.0 - 45.0 %    .2 (H) 83.0 - 97.0 fL    MCH 34.1 (H) 27.0 - 33.0 pg    MCHC 32.7 32.0 - 35.0 g/dL    RDW 22.5 (H) 11.7 - 14.5 %    RDW-SD 79.6 (H) 37.0 - 49.0 fl    MPV 10.5 8.9 - 12.1 fL    Platelets 198 150 - 375 10*3/mm3    Neutrophil % 85.0 (H) 39.0 - " 75.0 %    Lymphocyte % 5.7 (L) 20.0 - 49.0 %    Monocyte % 7.1 4.0 - 12.0 %    Eosinophil % 0.4 (L) 1.0 - 5.0 %    Basophil % 0.4 0.0 - 1.1 %    Immature Grans % 1.4 (H) 0.0 - 0.5 %    Neutrophils, Absolute 13.62 (H) 1.50 - 7.00 10*3/mm3    Lymphocytes, Absolute 0.91 (L) 1.00 - 3.50 10*3/mm3    Monocytes, Absolute 1.13 (H) 0.25 - 0.80 10*3/mm3    Eosinophils, Absolute 0.07 0.00 - 0.36 10*3/mm3    Basophils, Absolute 0.07 0.00 - 0.10 10*3/mm3    Immature Grans, Absolute 0.22 (H) 0.00 - 0.03 10*3/mm3    nRBC 0.0 0.0 - 0.0 /100 WBC       Assessment/Plan   Assessment/Plan:   1. Stage IV, T3N0M1 extrahepatic cholangiocarcinoma.    - palliative Ada/Ox q 2 weeks started 3/14/16. Had to discontinue due to reaction to Oxaliplatin. Also received FOLFIRI, but had intolerable side effects.    - Currently on palliative Gemzar; will have to adjust chemo to every other week and its already dose reduced for liver function. She understands that goal is palliative and that I doubt we're getting shrinkage of the tumor with such a low dose of Gemzar, but we may at least be slowing the disease process and she's feeling well thus we'll continue.    - Plan repeat imaging in 4 weeks.    2. Nausea and vomiting. Improved with treatment for gastric ulcers. Monitor. Stable today.  3. Biliary obstruction. Improved status post ERCP (early August) with removal of calculi and debris with dramatic improvement in symptoms. Monitor.   4. Cancer related pain.   - Norco 5/325 mg every 4 hours prn.   5. Hyponatremia. Likely related to tumor/po intake. Monitor. She remains asymptomatic.  I've encouraged a salt rich diet.  6. Thrombocytopenia due to chemotherapy.  Adjusting as above.

## 2017-03-04 PROBLEM — N39.0 ACUTE UTI: Status: ACTIVE | Noted: 2017-01-01

## 2017-03-05 NOTE — H&P
"  Subjective .     REASON FOR ADMISSION:    1. Stage IV, T3N0M1 cholangiocarcinoma.   HISTORY OF PRESENT ILLNESS:  The patient is a 81 y.o. year old female who is here for follow-up with the above-mentioned history.    History of Present Illness   patient is 81-year-old female with stage IV cholangiocarcinoma followed by Dr. Lizette walsh who has placed her on palliative chemotherapy with every other week gemcitabine.  She saw the patient on 2/23/2017 when she received chemotherapy.  Now patient has been admitted with generalized weakness which is progressively worse.  Family reports that patient is not eating or drinking and has been sleeping more and is intermittently confused.  There is also increased lower extremity swelling.    CBC today shows a hemoglobin of 6.4, hematocrit 17.8, white count of 7.7 and a platelet count of 79.  CMP shows a creatinine of 0.68, albumin of 2.0, alkaline phosphatase of 174, total bilirubin of 2.0.  Urine culture was done yesterday and it showed that it was positive for nitrites.  Patient has 4+ bacteria.  Culture is pending.  Blood culture ×2 is pending.  Patient is becoming pancytopenic likely secondary to chemotherapy.    Past Medical History   Diagnosis Date   • Anxiety    • Arthritis      \"bad right knee\"   • Bile duct cancer      Metastatic cholangiocarcinoma    • Broken arm      LEFT   • Cancer    • Diabetes mellitus    • Diarrhea due to drug    • History of transfusion    • Hypertension    • PONV (postoperative nausea and vomiting)        ONCOLOGIC HISTORY:  (History from previous dates can be found in the separate document.)    No current facility-administered medications on file prior to encounter.      Current Outpatient Prescriptions on File Prior to Encounter   Medication Sig Dispense Refill   • amLODIPine (NORVASC) 10 MG tablet TAKE 1 TABLET EVERY MORNING 90 tablet 3   • diphenoxylate-atropine (LOMOTIL) 2.5-0.025 MG per tablet Take 2 tablets by mouth Every 6 (Six) " Hours As Needed for diarrhea. 120 tablet 1   • glucose blood (ACCU-CHEK ACTIVE STRIPS) test strip 1 each by Other route As Needed. Use as instructed     • HYDROcodone-acetaminophen (NORCO) 5-325 MG per tablet Take 1 tablet by mouth Every 4 (Four) Hours As Needed (pain). 120 tablet 0   • loperamide (IMODIUM) 2 MG capsule Take 1 capsule by mouth 4 (Four) Times a Day As Needed for diarrhea.  0   • magnesium chloride ER (MAG-DELAY) 535 (64 MG) MG CR tablet Take 128 mg by mouth 2 (Two) Times a Day.     • ondansetron (ZOFRAN) 8 MG tablet Take 1 tablet by mouth Every 8 (Eight) Hours As Needed for nausea or vomiting. 45 tablet 1   • pantoprazole (PROTONIX) 40 MG EC tablet Take 1 tablet by mouth Daily. 90 tablet 3   • polyethylene glycol (MIRALAX) packet Take 17 g by mouth Daily. prn     • potassium chloride (K-TAB) 20 MEQ tablet controlled-release ER tablet Take 1 tablet by mouth Daily. 30 tablet    • saccharomyces boulardii (FLORASTOR) 250 MG capsule Take 1 capsule by mouth 2 (Two) Times a Day.     • Saxagliptin-Metformin ER 2.5-1000 MG tablet sustained-release 24 hour Take 2.5-1,000 mg by mouth daily. 90 tablet 3   • sucralfate (CARAFATE) 1 G tablet Take 1 tablet by mouth 4 (Four) Times a Day. 360 tablet 3       ALLERGIES:     Allergies   Allergen Reactions   • Morphine And Related Confusion   • Sulfa Antibiotics      Patient cannot remember reaction.   • Oxaliplatin Palpitations, Other (See Comments) and Angioedema     Severe flushing for head, neck and palms.  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 Reports she drinks about 1.2 0 1.8 oz of alcohol per week.          Cancer-related family history is not on file.     Review of Systems  A comprehensive 14 point review of systems was performed and was negative except as mentioned.    Objective      Vitals:    03/05/17 0623   BP: 103/62   Pulse:    Resp:    Temp:    SpO2:       Current Status 2/23/2017   ECOG score 1       Physical Exam      Patient is not confused today she is alert and oriented ×3.  NECK:  Supple with good range of motion; no thyromegaly or masses, no JVD.  LYMPHATICS:  No cervical, supraclavicular, axillary or inguinal adenopathy.  CHEST:  Lungs clear to auscultation. Good airflow.  CARDIAC:  Regular rate and rhythm without murmurs, rubs or gallops. Normal S1,S2.  ABDOMEN:  Soft, nontender with no hepatosplenomegaly or masses.  EXTREMITIES:  No clubbing, cyanosis positive edema  NEUROLOGICAL:  Cranial Nerves II-XII grossly intact.  No focal neurological deficits.  PSYCHIATRIC:  Normal affect and mood.      RECENT LABS:  Hematology WBC   Date Value Ref Range Status   03/05/2017 7.70 4.50 - 10.70 10*3/mm3 Final     RBC   Date Value Ref Range Status   03/05/2017 1.76 (L) 3.90 - 5.20 10*6/mm3 Final     HEMOGLOBIN   Date Value Ref Range Status   03/05/2017 6.4 (C) 11.9 - 15.5 g/dL Final     HEMATOCRIT   Date Value Ref Range Status   03/05/2017 17.8 (C) 35.6 - 45.5 % Final     PLATELETS   Date Value Ref Range Status   03/05/2017 79 (L) 140 - 500 10*3/mm3 Final        Assessment/Plan      1. Stage IV, T3N0M1 extrahepatic cholangiocarcinoma.   - palliative [place]/Ox q 2 weeks started 3/14/16. Had to discontinue due to reaction to Oxaliplatin. Also received FOLFIRI, but had intolerable side effects.   - Currently on palliative Gemzar; will have to adjust chemo to every other  week and its already dose reduced for liver function. She understands that goal is palliative and that I doubt we're getting shrinkage of the tumor with such a low dose of Gemzar, but we may at least be slowing the disease process and she's feeling well thus we'll continue.   - Plan repeat imaging in 4 weeks.    2. Nausea and vomiting. Improved with treatment for gastric ulcers. Monitor. Stable today.    3. Biliary obstruction. Improved status post ERCP (early August) with removal of calculi and debris with dramatic improvement in symptoms. Monitor.     4. Cancer related pain.  - Norco 5/325 mg every 4 hours prn.     5. Hyponatremia. Likely related to tumor/po intake. Monitor. She remains asymptomatic. I've encouraged a salt rich diet.    6.  Urinary tract infection, patient received 1 dose of Rocephin in the emergency room.  We will continue the antibiotic.  She was given IV Levaquin.  Await urine cultures and continue IV Levaquin.  7.  Pancytopenia secondary to chemotherapy.    8.  Fatigue continue IV fluids at 75 cc/h    Felicita Vick MD                  Cc:  Luís Hendrix MD

## 2017-03-05 NOTE — PLAN OF CARE
Problem: Patient Care Overview (Adult)  Goal: Plan of Care Review  Outcome: Ongoing (interventions implemented as appropriate)    03/05/17 0248   Coping/Psychosocial Response Interventions   Plan Of Care Reviewed With patient;daughter   Patient Care Overview   Progress no change   Outcome Evaluation   Outcome Summary/Follow up Plan ER admit d/t increase generalized weakness & AMS more than baseline. Admitted for acute UTI. BLE edema +4 pitting noted. Daughter by bedside.         Problem: Oncology Care (Adult)  Goal: Signs and Symptoms of Listed Potential Problems Will be Absent or Manageable (Oncology Care)  Outcome: Ongoing (interventions implemented as appropriate)    Problem: Infection, Risk/Actual (Adult)  Goal: Identify Related Risk Factors and Signs and Symptoms  Outcome: Outcome(s) achieved Date Met:  03/05/17

## 2017-03-05 NOTE — PLAN OF CARE
Problem: Patient Care Overview (Adult)  Goal: Plan of Care Review  Outcome: Ongoing (interventions implemented as appropriate)    03/05/17 7729   Coping/Psychosocial Response Interventions   Plan Of Care Reviewed With patient   Patient Care Overview   Progress progress toward functional goals as expected   Outcome Evaluation   Outcome Summary/Follow up Plan no pain blood today weak and assist of 2        Goal: Adult Individualization and Mutuality  Outcome: Ongoing (interventions implemented as appropriate)    Problem: Oncology Care (Adult)  Goal: Signs and Symptoms of Listed Potential Problems Will be Absent or Manageable (Oncology Care)  Outcome: Ongoing (interventions implemented as appropriate)    Problem: Infection, Risk/Actual (Adult)  Goal: Infection Prevention/Resolution  Outcome: Ongoing (interventions implemented as appropriate)

## 2017-03-05 NOTE — ED NOTES
Pt. Family member states that pt. Has had increased lethargy in the past 3 days. Pt. Is able to use a walker at home, but for the past 3 days she has not been able to get off the couch. Pt. Does have intermittent confusion, but now pt. Has had increased confusion.     Kyle Leyva RN  03/04/17 7102

## 2017-03-05 NOTE — ED PROVIDER NOTES
History   Pt presents to the ED c/o generalized weakness that is progressively worsening. Family also reports that the pt is not eating/drinking, has been sleeping more, and is intermittently confused. Pt also complains of increased BLE edema. She denies CP, SOA, N/V/D, abd pain, or urinary sx. Pt last chemo treatment was 2/23/17.      Exam  A&Ox3, no acute distress  HEENT is unremarkable  Heart is RRR and without murmur, lungs are clear bilaterally   Abd is soft, non distended, non tender, positive bowel sounds  3+ BLE edema    Course  Plan to consult Oncology and admit pt for further treatment.     Attestation:  I supervised care provided by the midlevel provider.    We have discussed this patient's history, physical exam, and treatment plan.    I have reviewed the note and personally saw and examined the patient and agree with the plan of care.  I agree with the midlevel provider's findings and plan. I reviewed the midlevel providers note.     Documentation assistance provided by deyvi Lanza for Dr Nelson Information recorded by the deyvi was done at my direction and has been verified and validated by me.     Nisha Lanza  03/04/17 6170       Jose Martin Nelson MD  03/05/17 0001

## 2017-03-05 NOTE — ED NOTES
Pt alert and oriented x 2, unable to give today's date or month     Shanda Parada, SEBASTIAN  03/04/17 2033

## 2017-03-06 NOTE — PROGRESS NOTES
Discharge Planning Assessment  [facility]     Patient Name: Charlee Barrera  MRN: [id number]  Today's Date: 3/6/2017    Admit Date: 3/4/2017          Discharge Needs Assessment       03/06/17 1459    Living Environment    Lives With spouse    Living Arrangements house    Primary Care Provided By child(angelika) (specify)    Quality Of Family Relationships supportive    Discharge Needs Assessment    Concerns To Be Addressed denies needs/concerns at this time    Equipment Currently Used at Home walker, rolling    Transportation Available car;family or friend will provide            Discharge Plan       03/06/17 1500    Case Management/Social Work Plan    Plan home    Patient/Family In Agreement With Plan yes    Additional Comments Facesheet updated and IMM has been documented.  Patient lives in a house with her spouse with the care of her adult children.  Met with her daughter, Myriam [phone].  She plans to continue to help with her care at her home.  Patient has not used HH or SNU.  Her  does recieve personal care with [facility].  She prefers [facility] in [place].  She agrees with plan for [facility] to follow and assist with DC planning and needs..............................Deborah Mike RN        Discharge Placement     No information found                Demographic Summary       03/06/17 1458    Primary Care Physician Information    Phone [phone]      03/06/17 1457    Primary Care Physician Information    Name Michele Peguero MD      03/06/17 1452    Referral Information    Admission Type inpatient    Arrived From home or self-care    Contact Information    Permission Granted to Share Information With family/designee    Comments Myriam Owen, daughter            Functional Status       03/06/17 1458    Functional Status Current    Ambulation 3-->assistive equipment and person    Transferring 3-->assistive equipment and person    Toileting 3-->assistive equipment and person    Bathing  2-->assistive person    Eating 0-->independent    Communication 0-->understands/communicates without difficulty    Functional Status Prior    Ambulation 1-->assistive equipment    Transferring 1-->assistive equipment    Toileting 1-->assistive equipment    Bathing 1-->assistive equipment    Dressing 0-->independent    Eating 0-->independent    Communication 0-->understands/communicates without difficulty    Swallowing 0-->swallows foods/liquids without difficulty    Activity Tolerance    Usual Activity Tolerance good    Current Activity Tolerance fair    Cognitive/Perceptual/Developmental    Current Mental Status/Cognitive Functioning unable to assess    Recent Changes in Mental Status/Cognitive Functioning no changes            Psychosocial     None            Abuse/Neglect     None            Legal     None            Substance Abuse     None            Patient Forms     None          Deborah Mike, RN

## 2017-03-06 NOTE — PLAN OF CARE
Problem: Patient Care Overview (Adult)  Goal: Plan of Care Review  Outcome: Ongoing (interventions implemented as appropriate)    03/06/17 1091   Coping/Psychosocial Response Interventions   Plan Of Care Reviewed With patient;daughter   Patient Care Overview   Progress improving   Outcome Evaluation   Outcome Summary/Follow up Plan No complaints of pain. Stage 1 PU found on coccyx, picture taken and sent to HIM, cream applied and covered with mepliex. IVF and iv abx continued. IVF d/c'd this evening. Mag and sucralfate continued. Awaiting urine culture. will continue to monitor.       Goal: Adult Individualization and Mutuality  Outcome: Ongoing (interventions implemented as appropriate)  Goal: Discharge Needs Assessment  Outcome: Ongoing (interventions implemented as appropriate)    Problem: Oncology Care (Adult)  Goal: Signs and Symptoms of Listed Potential Problems Will be Absent or Manageable (Oncology Care)  Outcome: Ongoing (interventions implemented as appropriate)    Problem: Infection, Risk/Actual (Adult)  Goal: Infection Prevention/Resolution  Outcome: Ongoing (interventions implemented as appropriate)    Problem: Pressure Ulcer (Adult)  Goal: Signs and Symptoms of Listed Potential Problems Will be Absent or Manageable (Pressure Ulcer)  Outcome: Ongoing (interventions implemented as appropriate)

## 2017-03-06 NOTE — PROGRESS NOTES
LOS: 2 days       Chief Complaint:  Generalized weakness, decreased oral intake, confusion.      Interval History:   Patient reports that she has improved significantly following transfusion and antibiotic therapy.  She is sitting at the side of the bed today with no complaints eating dinner.  Oral intake has increased significantly and is near her baseline.  She is alert and oriented.      Review of Systems:    Review of systems was obtained with pertinent positive findings as noted in the interval history.  All other systems negative.    Objective     Vital Signs  Temp:  [97.6 °F (36.4 °C)-98 °F (36.7 °C)] 97.6 °F (36.4 °C)  Heart Rate:  [87-92] 89  Resp:  [16-20] 16  BP: ()/(58-71) 104/71        Physical Exam:     GENERAL:  Elderly female in no apparent distress  SKIN:  Warm, dry without rashes, purpura or petechiae.  HEAD:  Normocephalic.  EYES:  Pupils equal, round and reactive to light.  EOMs intact.  Conjunctivae normal.  EARS:  Hearing intact.  NOSE:  Septum midline.  No excoriations or nasal discharge.  MOUTH:  Tongue is well-papillated; no stomatitis or ulcers.  Lips normal.  THROAT:  Oropharynx without lesions or exudates.  NECK:  Supple with good range of motion; no thyromegaly or masses, no JVD.  LYMPHATICS:  No cervical, supraclavicular, axillary or inguinal adenopathy.  CHEST:  Scattered bibasilar crackles  CARDIAC:  Regular rate and rhythm without murmurs, rubs or gallops. Normal S1,S2.  ABDOMEN:  Nontender, distended.  EXTREMITIES:  1+ bilateral lower extremity edema  NEUROLOGICAL:  Cranial Nerves II-XII grossly intact.  No focal neurological deficits.  PSYCHIATRIC:  Normal affect and mood.           Results Review:     I reviewed the patient's new clinical results.      Results from last 7 days  Lab Units 03/06/17  0518   WBC 10*3/mm3 9.76   HEMOGLOBIN g/dL 9.9*   HEMATOCRIT % 28.0*   PLATELETS 10*3/mm3 100*     Lab Results   Component Value Date    NEUTROABS 7.82 03/06/2017       Results  from last 7 days  Lab Units 03/06/17  0518   SODIUM mmol/L 133*   POTASSIUM mmol/L 4.1   CHLORIDE mmol/L 101   TOTAL CO2 mmol/L 22.4   BUN mg/dL 15   CREATININE mg/dL 0.67   GLUCOSE mg/dL 128*   CALCIUM mg/dL 7.9*           Results from last 7 days  Lab Units 03/06/17  0518   MAGNESIUM mg/dL 1.5*         Medication Review: Yes     Assessment/Plan     1. Stage IV, T3N0M1 extrahepatic cholangiocarcinoma.   - palliative [place]/Ox q 2 weeks started 3/14/16. Had to discontinue due to reaction to Oxaliplatin. Also received FOLFIRI, but had intolerable side effects.   - Currently on palliative Gemzar;  adjusted chemo to every other week and its already dose reduced for liver function. She understands that goal is palliative and that I doubt we're getting shrinkage of the tumor with such a low dose of Gemzar, but we may at least be slowing the disease process and she's feeling well thus we'll continue.   - Plan repeat imaging in 4 weeks.   - Last dose Gemzar on 2/23/17, next dose due 3/9/17, will consider potential need to delay due to current urinary tract infection.  2. Nausea and vomiting. Resolved.     3. Biliary obstruction. Improved status post ERCP (early August) with removal of calculi and debris with dramatic improvement in symptoms.  Bilirubin currently stable at 2.4, alkaline phosphatase stable at 184.      4. Cancer related pain.  - Norco 5/325 mg every 4 hours prn.      5. Hyponatremia. Likely related to tumor/po intake. Monitor. She remains asymptomatic. I've encouraged a salt rich diet.  Stable currently     6.  Urinary tract infection: Urine culture with greater than 100,000 colonies gram-negative bacilli, await final ID and sensitivity.  Continue for now on Levaquin.    7. Pancytopenia secondary to chemotherapy: Juliet of presumed secondary to malignancy and chemotherapy, hemoglobin improved after transfusion to 9.9 today.  No clinical evidence of GI blood loss.  We will check an anemia evaluation today as well  as stool for occult blood.  Platelet count improved today to 100,000.     8.  Dehydration: Patient has been continuing IV fluids at 75 cc/h. early developing mild signs of volume overload with bibasilar crackles and 1+ lower extremity edema, currently with adequate by mouth intake so will discontinue IV fluids.    9.  Hypomagnesemia: Continue PO Mag chloride 2 tabs bid. 2 grams IV magnesium sulfate today for a level of 1.5    Plan:  1. Continue Levaquin, await ID/sensitivity urine culture  2. Discontinue IV fluids  3. Anemia evaluation with iron panel, ferritin, B12, folate and stool for occult blood  4. Magnesium sulfate 2 g IV today            Romain Beckman MD  03/06/17  6:51 PM

## 2017-03-07 NOTE — NURSING NOTE
WOCN: lower coccyx shear and friction red blanchable. Edges dry. Patient states dressing placed yesterday has made it feel much better. Instructed patient to reposition and limit time up in chair. Instructed to shift weight sitting up in chair. Patient verbalizes understanding

## 2017-03-07 NOTE — PLAN OF CARE
Problem: Pressure Ulcer (Adult)  Goal: Signs and Symptoms of Listed Potential Problems Will be Absent or Manageable (Pressure Ulcer)  Outcome: Ongoing (interventions implemented as appropriate)

## 2017-03-07 NOTE — DISCHARGE SUMMARY
Date of Discharge:  3/7/2017    Discharge Diagnosis:   1. Metastatic cholangiocarcinoma  2. Klebsiella urinary tract infection  3. Pancytopenia secondary to chemotherapy  4. Dehydration  5. Hypomagnesemia    Presenting Problem/History of Present Illness  Acute UTI [N39.0]  Generalized weakness [R53.1]  Leukocytosis, unspecified type [D72.829]       Hospital Course  1. Stage IV, T3N0M1 extrahepatic cholangiocarcinoma.   - palliative [place]/Ox q 2 weeks started 3/14/16. Had to discontinue due to reaction to Oxaliplatin. Also received FOLFIRI, but had intolerable side effects.   - Currently on palliative Gemzar; adjusted chemo to every other week and its already dose reduced for liver function. She understands that goal is palliative and that I doubt we're getting shrinkage of the tumor with such a low dose of Gemzar, but we may at least be slowing the disease process and she's feeling well thus we'll continue.   - Plan repeat imaging in 4 weeks.   - Last dose Gemzar on 2/23/17, next dose due 3/9/17.  Discussed with patient and family today, they have already moved her treatment to 3/14/17 for logistic reasons.  We would want to delay treatment until this date anyway in regards to treatment of her urinary tract infection.  We will have a physician or nurse practitioner visit that day.  She is already scheduled for subsequent PET scan and then to see Dr. Childs for the treatment 2 weeks following that on 3/27/17.  2. Nausea and vomiting. Resolved.      3. Biliary obstruction. Improved status post ERCP (early August) with removal of calculi and debris with dramatic improvement in symptoms. Bilirubin currently stable at 2.4, alkaline phosphatase stable at 184.       4. Cancer related pain.  - Norco 5/325 mg every 4 hours prn.      5. Hyponatremia. Likely related to tumor/po intake. Monitor. She remains asymptomatic. I've encouraged a salt rich diet. Stable currently.      6. Urinary tract infection: Urine culture with  Klebsiella oxytoca greater than 100,000 colonies, sensitive to Levaquin.  Blood cultures negative to date.  She will receive a prescription at discharge for 5 additional days to complete a week of treatment.     7. Pancytopenia secondary to chemotherapy: Anemia  secondary to malignancy/chronic disease and chemotherapy, hemoglobin improved after transfusion 2 units PRBC to 9.9 and stable today. No clinical evidence of GI blood loss.  Anemia evaluation showing no evidence of iron, folate, nor B12 deficiency.  Platelet count improved today to 126,000.      8. Dehydration: This has resolved and the patient is back to adequate oral intake.     9. Hypomagnesemia: Continue PO Mag chloride 2 tabs bid. 2 grams IV magnesium sulfate given yesterday, magnesium level acceptable today at 2.0.    Procedures Performed         Consults:   Consults     Date and Time Order Name Status Description    3/4/2017 2318 Family Medicine Consult Completed               Condition on Discharge:  Good    Vital Signs  Temp:  [97.6 °F (36.4 °C)-98 °F (36.7 °C)] 97.6 °F (36.4 °C)  Heart Rate:  [84-91] 84  Resp:  [16-20] 16  BP: (104-130)/(65-74) 112/74    Physical Exam:  GENERAL: Elderly female in no apparent distress  SKIN: Warm, dry without rashes, purpura or petechiae.  MOUTH: Tongue is well-papillated; no stomatitis or ulcers. Lips normal.  THROAT: Oropharynx without lesions or exudates.  CHEST: Improved minimal bibasilar crackles  CARDIAC: Regular rate and rhythm without murmurs, rubs or gallops. Normal S1,S2.  ABDOMEN: Nontender, distended.  EXTREMITIES: Trace bilateral lower extremity edema  NEUROLOGICAL: Cranial Nerves II-XII grossly intact. No focal neurological deficits.  PSYCHIATRIC: Normal affect and mood.     Discharge Disposition  Home or Self Care    Discharge Medications   Charlee Barrera   Home Medication Instructions CIRA:[id number]    Printed on:03/07/17 2054   Medication Information                      amLODIPine (NORVASC) 10 MG  tablet  TAKE 1 TABLET EVERY MORNING             diphenoxylate-atropine (LOMOTIL) 2.5-0.025 MG per tablet  Take 2 tablets by mouth Every 6 (Six) Hours As Needed for diarrhea.             glucose blood (ACCU-CHEK ACTIVE STRIPS) test strip  1 each by Other route As Needed. Use as instructed             HYDROcodone-acetaminophen (NORCO) 5-325 MG per tablet  Take 1 tablet by mouth Every 4 (Four) Hours As Needed (pain).             levoFLOXacin (LEVAQUIN) 500 MG tablet  Take 1 tablet by mouth Daily.             loperamide (IMODIUM) 2 MG capsule  Take 1 capsule by mouth 4 (Four) Times a Day As Needed for diarrhea.             magnesium chloride ER (MAG-DELAY) 535 (64 MG) MG CR tablet  Take 128 mg by mouth 2 (Two) Times a Day.             ondansetron (ZOFRAN) 8 MG tablet  Take 1 tablet by mouth Every 8 (Eight) Hours As Needed for nausea or vomiting.             pantoprazole (PROTONIX) 40 MG EC tablet  Take 1 tablet by mouth Daily.             polyethylene glycol (MIRALAX) packet  Take 17 g by mouth Daily. prn             potassium chloride (K-TAB) 20 MEQ tablet controlled-release ER tablet  Take 1 tablet by mouth Daily.             saccharomyces boulardii (FLORASTOR) 250 MG capsule  Take 1 capsule by mouth 2 (Two) Times a Day.             sucralfate (CARAFATE) 1 G tablet  Take 1 tablet by mouth 4 (Four) Times a Day.                 Discharge Diet: regular as tolerated    Activity at Discharge: as tolerated    Follow-up Appointments  Future Appointments  Date Time Provider Department Center   3/14/2017 8:00 AM LAB CHAIR 1 MARY KAY [facility]  [facility]   3/14/2017 8:30 AM CHAIR 02 MARY KAY Garcia [facility]  [facility]   3/21/2017 9:30 AM [facility] PET CT  [facility]   3/27/2017 8:10 AM LAB CHAIR 6 MARY KAY SHARMA  [facility]   3/27/2017 8:40 AM [facility] [place]   3/27/2017 9:30 AM CHAIR 09 MARY KAY Garcia MD  [facility]         Test Results Pending at Discharge   Order Current Status    Blood Culture Preliminary  result    Blood Culture Preliminary result           Romain Beckman MD  03/07/17  6:25 PM

## 2017-03-17 NOTE — TELEPHONE ENCOUNTER
----- Message from Cecille Spangler sent at 3/17/2017  2:53 PM EDT -----  Contact: daughter   Daughter is calling because after her mother stop her taking  her lasix her legs are swollen and weeping               [phone]

## 2017-03-17 NOTE — TELEPHONE ENCOUNTER
Pt's daughter calling because her legs are swollen and her left leg is weeping. She took 2 days of lasix which helped but the swelling is still present. She has been wrapping the left left in a guaze and has been applying antibiotic ointment on the leg. She was wondering if this was appropriate. D/W Dr. Childs. Per Dr. Childs, pt should start on Aldactone 50mg daily. She should also stop her potassium supplement. She can use guaze and ace wrap for her lower extremity swelling. Explained all of this to pt's daughter. She v/u. Aldactone escribed to pt's pharmacy.

## 2017-03-21 NOTE — PROGRESS NOTES
Patient's bilirubin is up. Called her to discuss and she's feeling well without any abdominal symptoms. Since she's feeling well, I think its reasonable to see what her imaging today shows and I will call her once I have those results. She likely needs to get back in to see Dr Buckner, but I will make further plans once imaging available.

## 2017-03-23 NOTE — TELEPHONE ENCOUNTER
Pts daughter calling to ask what was discussed with pt when Dr. Childs called her a few days ago. Explained to daughter that she said pt's bilirubin was high but that she was going to wait for scan results to how to proceed. She said she would call with results. Daughter asked that she be called instead of pt. Informed her I would send this message to Dr. Childs. Message sent to Dr. Childs.

## 2017-03-23 NOTE — TELEPHONE ENCOUNTER
----- Message from Cecille Spangler sent at 3/23/2017 12:33 PM EDT -----  Contact: daughter   Pt daughter has questions about the phone call her mother got about her mother labs              [phone]

## 2017-03-24 NOTE — TELEPHONE ENCOUNTER
Patient was going to be direct admitted per Dr. Childs but there are no beds available.  We will bring patient into the office today for a direct admit by NP.  We will start IVF's and draw labs while waiting for a bed.  Rebecca said she will see her in the back.

## 2017-03-24 NOTE — TELEPHONE ENCOUNTER
----- Message from Nesha Garcia sent at 3/24/2017 10:30 AM EDT -----   Pt's daughter, Dr. Barrera wants to talk to a nurse      [phone]      Spoke with daughter, she would like for patient to be direct admitted per Dr. Childs for pain.  Spoke with Dr. Childs and she is ok with doing a direct admit to [facility] for hyperbilirubinemia.  Spoke with scheduling to get a bed ready.

## 2017-03-24 NOTE — H&P
Subjective .     REASON FOR ADMISSION: Hyperbilirubinemia, acute abdominal pain, Extrahepatic cholangiocarcinoma     HISTORY OF PRESENT ILLNESS:  The patient is a 81 y.o. year old female who is here for follow-up with the above-mentioned history. She is currently on palliative chemotherapy with Gemzar every other week at a reduced dose for impaired liver function. She received her last dose on 3/14/2017. She was seen in our office for lab evaluation on 3/21/2017 and was found to have an elevated bilirubin of 6.1. Dr. Childs ordered a CT chest, abdomen, and pelvis which revealed a development of a moderately large volume of ascites throughout the abdomen and pelvis without significant significant change in degree of intrahepatic biliary dilatation.  The 5 cm partially extrahepatic mass appeared to largely unchanged.  Patient was to follow-up with Dr. Childs on Monday, March 27, 2017 for determination of next plan of care.  Unfortunately, patient's daughter called today reporting that patient had severe, acute abdominal pain last evening with a diminished appetite and nausea. Patient's daughter requested that patient be admitted to the hospital.    Patient comes to the office today jaundiced and ill-appearing.  She states that over the last few days abdominal pain has worsened and she has developed increasing abdominal distention. She has been utilizing Norco with minimal relief in pain. She has noted anorexia and nausea over the last 2 days and has been unable to eat a regular meal with inducing pain and nausea. She also admits to poor hydration status, drinking only 1-2 glasses of water daily.  She exhibits bilateral 3+ lower extremity edema with weeping. Legs are presently wrapped to prevent further weeping. Stools are shahram-colored, but have remained regular in frequency. She denies any fever or chills. Abdominal exam revealed diffuse abdominal pain upon palpation. Patient has found it difficult to ambulate without  "eliciting pain, due to significant abdominal distention. Of note, she is up 3 pounds in the last week. She denies associated shortness of breath or productive cough. She is clearly uncomfortable in the supine position in her bed.         History of Present Illness      Past Medical History:   Diagnosis Date   • Anxiety    • Arthritis     \"bad right knee\"   • Bile duct cancer     Metastatic cholangiocarcinoma    • Broken arm     LEFT   • Cancer    • Diabetes mellitus    • Diarrhea due to drug    • History of transfusion    • Hypertension    • PONV (postoperative nausea and vomiting)        ONCOLOGIC HISTORY:  (History from previous dates can be found in the separate document.)    Current Outpatient Prescriptions on File Prior to Visit   Medication Sig Dispense Refill   • CRANBERRY PO Take 1 tablet by mouth 2 (Two) Times a Day.     • diphenoxylate-atropine (LOMOTIL) 2.5-0.025 MG per tablet Take 2 tablets by mouth Every 6 (Six) Hours As Needed for diarrhea. 120 tablet 1   • glucose blood (ACCU-CHEK ACTIVE STRIPS) test strip 1 each by Other route As Needed. Use as instructed     • HYDROcodone-acetaminophen (NORCO) 5-325 MG per tablet Take 1 tablet by mouth Every 4 (Four) Hours As Needed (pain). 120 tablet 0   • loperamide (IMODIUM) 2 MG capsule Take 1 capsule by mouth 4 (Four) Times a Day As Needed for diarrhea.  0   • magnesium chloride ER (MAG-DELAY) 535 (64 MG) MG CR tablet Take 128 mg by mouth 2 (Two) Times a Day.     • ondansetron (ZOFRAN) 8 MG tablet Take 1 tablet by mouth Every 8 (Eight) Hours As Needed for nausea or vomiting. 45 tablet 1   • pantoprazole (PROTONIX) 40 MG EC tablet Take 1 tablet by mouth Daily. 90 tablet 3   • polyethylene glycol (MIRALAX) packet Take 17 g by mouth Daily. prn     • saccharomyces boulardii (FLORASTOR) 250 MG capsule Take 1 capsule by mouth 2 (Two) Times a Day.     • spironolactone (ALDACTONE) 50 MG tablet Take 1 tablet by mouth Daily. 30 tablet 0   • sucralfate (CARAFATE) 1 G " tablet Take 1 tablet by mouth 4 (Four) Times a Day. 360 tablet 3   • [DISCONTINUED] amLODIPine (NORVASC) 10 MG tablet TAKE 1 TABLET EVERY MORNING 90 tablet 3   • [DISCONTINUED] furosemide (LASIX) 20 MG tablet Take 1 tablet by mouth Daily. Take as directed 7 tablet 0   • [DISCONTINUED] SAXagliptin-MetFORMIN ER (KOMBIGLYZE XR) 2.5-1000 MG tablet sustained-release 24 hour Take  by mouth Daily.       No current facility-administered medications on file prior to visit.        ALLERGIES:     Allergies   Allergen Reactions   • Morphine And Related Confusion   • Sulfa Antibiotics      Patient cannot remember reaction.   • Oxaliplatin Palpitations, Other (See Comments) and Angioedema     Severe flushing for head, neck and palms.  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 Reports she drinks about 1.2 0 1.8 oz of alcohol per week.          Cancer-related family history is not on file.     Review of Systems   Constitutional: Positive for activity change, fatigue and unexpected weight change. Negative for chills and fever.   HENT: Negative for congestion and mouth sores.    Respiratory: Negative for shortness of breath.    Cardiovascular: Positive for leg swelling. Negative for chest pain and palpitations.   Gastrointestinal: Positive for abdominal distention, abdominal pain and nausea. Negative for diarrhea, rectal pain and  vomiting.   Genitourinary: Negative for hematuria.   Musculoskeletal: Negative for arthralgias and myalgias.   Skin: Positive for color change. Negative for rash.   Hematological: Negative for adenopathy. Does not bruise/bleed easily.         Objective      Vitals:    03/24/17 1513   BP: 138/74   Pulse: 104   Resp: 16   Temp: 97.6 °F (36.4 °C)   SpO2: 97%      Current Status 3/24/2017   ECOG score 1       Physical Exam   Constitutional: She is oriented to person, place, and time.   Ill-appearing, supine position in bed, jaundiced   HENT:   Head: Normocephalic.   Mouth/Throat: No oropharyngeal exudate.   Eyes: EOM are normal. Pupils are equal, round, and reactive to light. Scleral icterus is present.   Neck: Normal range of motion.   Cardiovascular: Normal rate, regular rhythm and normal heart sounds.    Pulmonary/Chest: Effort normal and breath sounds normal. She has no wheezes.   Abdominal: She exhibits distension. There is tenderness (diffuse ).   Hyperactive bowel sounds   Musculoskeletal: She exhibits edema (3+ lower extremity edema, legs wrapped to prevent wheeping ).   Neurological: She is alert and oriented to person, place, and time.   Skin: Skin is warm and dry.   icteric   Psychiatric: She has a normal mood and affect. Her behavior is normal.         RECENT LABS:  Hematology WBC   Date Value Ref Range Status   03/24/2017 6.97 4.00 - 10.00 10*3/mm3 Final     RBC   Date Value Ref Range Status   03/24/2017 2.51 (L) 3.90 - 5.00 10*6/mm3 Final     Hemoglobin   Date Value Ref Range Status   03/24/2017 9.0 (L) 11.5 - 14.9 g/dL Final     Hematocrit   Date Value Ref Range Status   03/24/2017 26.0 (L) 34.0 - 45.0 % Final     Platelets   Date Value Ref Range Status   03/24/2017 73 (L) 150 - 375 10*3/mm3 Final           Lab Results   Component Value Date    GLUCOSE 176 (H) 03/24/2017    BUN 10 03/24/2017    CREATININE 0.49 (L) 03/24/2017    EGFRIFNONA 122 03/24/2017    EGFRIFAFRI  09/19/2016      Comment:      <15  Indicative of kidney failure.    BCR 20.4 03/24/2017    K 4.2 03/24/2017    CO2 22.3 03/24/2017    CALCIUM 7.9 (L) 03/24/2017    ALBUMIN 2.10 (L) 03/24/2017    LABIL2 0.6 (L) 03/24/2017    AST 39 (H) 03/24/2017    ALT 22 03/24/2017       Assessment/Plan   Mrs. Barrera is an 81 year old patient that we treat with palliative Gemzar for an extrahepatic cholangiocarcinoma. Approximately one week ago she developed acute abdominal pain and distention.  Labs revealed a bilirubin of 6.1.  Patient underwent a CT of the chest, abdomen, and pelvis which revealed ascites, however no significant changes in the degree of intrahepatic biliary dilatation and a stable extrahepatic mass.  Unfortunately, over the last 2 days abdominal pain has become progressive and patient has anorexia and nausea secondary to ascites. She has also noted extensive bilateral lower extremity edema with weeping.  Last night abdominal pain became intolerable and patient was unable to sleep due to discomfort.  She has had to take Norco, which has only provided modest relief.  She is icteric-appearing today in the office and frail.  She is requesting hospitalization. I have reviewed this with Dr. Childs, who is agreeable.  Of note, patient's bilirubin today has increased further to 8.0.  There is concern for a biliary obstruction, therefore Dr. Buckner will be consulted with a MRCP to be performed while patient is hospitalized.    PLAN:  1. Proceed with hospital admission to [facility] for hyperbilirubinemia and acute abdominal pain  2. Consult Dr. Buckner of Gastroenterology   3. We will hold on administration of fluids at this time, due to substantial lower extremity edema.                     Cc:  Rich Keita MD

## 2017-03-26 NOTE — ANESTHESIA POSTPROCEDURE EVALUATION
Patient: Charlee Barrera    Procedure Summary     Date Anesthesia Start Anesthesia Stop Room / Location    03/26/17 1246 1319  [facility] 1 /  [facility]       Procedure Diagnosis Surgeon Provider    ENDOSCOPIC RETROGRADE CHOLANGIOPANCREATOGRAPHY WITH BALLOON SWEEP (N/A ) Bile duct obstruction; Cholangiocarcinoma MD Juwan Mae MD          Anesthesia Type: MAC  Last vitals  BP (!) 157/105 (03/26/17 1337)    Temp      Pulse 115 (03/26/17 1347)   Resp 28 (03/26/17 1347)    SpO2 93 % (03/26/17 1347)      Post Anesthesia Care and Evaluation    Patient location during evaluation: PACU  Patient participation: complete - patient participated  Level of consciousness: awake  Pain score: 1  Pain management: adequate  Airway patency: patent  Anesthetic complications: No anesthetic complications  PONV Status: none  Cardiovascular status: acceptable  Respiratory status: acceptable  Hydration status: acceptable

## 2017-03-26 NOTE — ANESTHESIA PREPROCEDURE EVALUATION
Anesthesia Evaluation     Patient summary reviewed   NPO Status: > 8 hours   Airway   no difficulty expected  Dental      Pulmonary    Cardiovascular     (+) hypertension,       Neuro/Psych  GI/Hepatic/Renal/Endo    (+)  diabetes mellitus,     Musculoskeletal     Abdominal    Substance History      OB/GYN          Other                                    Anesthesia Plan    ASA 3     MAC     intravenous induction   Anesthetic plan and risks discussed with patient.

## 2017-03-27 PROBLEM — K21.9 GERD (GASTROESOPHAGEAL REFLUX DISEASE): Status: ACTIVE | Noted: 2017-01-01

## 2017-04-03 NOTE — PROGRESS NOTES
Labs reviewed with Dr Childs. , okay to treat , Order received for magnesium to add to todays labs. Urine specimen requested.  Urine specimen collected and sent to lab  Magnesium level 1.5 .  Reviewed with Dr Childs, Pt will receive 2gm iv magnesium today X 1.  Pt and family V/U

## 2017-04-03 NOTE — PROGRESS NOTES
"History:     Reason for follow up:   1. Stage IV, T3N0M1 cholangiocarcinoma.   2. Hospital follow up       HPI:  Charlee Barrera presents for follow-up of cholangiocarcinoma on Gemzar.  She was admitted to the hospital about a week and a half ago with progressive rise in her bilirubin.  She also had significant abdominal pain and nausea.  Dr. Buckner performed ERCP with cleaning of her prior stent.  She was artery feeling better on discharge. She also had a CT scan performed and a repeat MRCP that revealed shrinkage of the tumor but that was comparing it to a prior MRCP in February 2016.  She had ascites as well and underwent appear centesis with negative pathology.  Since being home she has been doing fairly well.  Her appetite has improved.  She did have 1 episode yesterday where she had chills and possible/likely fever although has no localizing infectious symptoms.  No abdominal pain or dysuria no cough.  She did not have any issues with a flush her port today.  Her lower extremity swelling has improved and the family continues to wrap them.  Her abdominal fluid is beginning to increase again.    Past Medical History:   Diagnosis Date   • Anxiety    • Arthritis     \"bad right knee\"   • Bile duct cancer     Metastatic cholangiocarcinoma    • Broken arm     LEFT   • Cancer    • Diabetes mellitus    • Diarrhea due to drug    • History of transfusion    • Hypertension    • PONV (postoperative nausea and vomiting)     and   Patient Active Problem List   Diagnosis   • Cholangiocarcinoma   • Hyperbilirubinemia   • Type 2 diabetes mellitus without complication   • Hyperglycemia   • Cancer related pain   • Fever and chills   • Chemotherapy-induced thrombocytopenia   • Anemia associated with chemotherapy   • Hypomagnesemia   • Hypokalemia   • Closed fracture of left olecranon process   • Wrist swelling   • Closed fracture of humerus   • Encounter for adjustment or management of vascular access device   • Bile duct " obstruction   • Neutropenic fever   • Weakness   • Generalized weakness   • Emesis, persistent   • Acute UTI   • GERD (gastroesophageal reflux disease)     Social History   Social History     Social History   • Marital status:      Spouse name: Carlitos   • Number of children: 4   • Years of education: High School     Occupational History   • 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 Reports she drinks about 1.2 0 1.8 oz of alcohol per week.      Family History  Family History   Problem Relation Age of Onset   • Heart disease Father    • Hypertension Mother      Allergies  Allergies   Allergen Reactions   • Tegaderm Ag Mesh [Silver] Dermatitis   • Morphine And Related Confusion   • Sulfa Antibiotics      Patient cannot remember reaction.   • Oxaliplatin Palpitations, Other (See Comments) and Angioedema     Severe flushing for head, neck and palms.  Requiring iv benadryl, iv pepcid and iv steroids (solucortef) to reverse       Medications: The current medication list was reviewed in the EMR.    Review of Systems  Review of Systems   Constitutional: Positive for appetite change, chills and fatigue. Negative for diaphoresis and fever.   HENT: Negative for congestion, ear pain, sinus pressure, tinnitus and trouble swallowing.    Eyes: Negative for discharge, redness, itching and visual disturbance.   Respiratory: Negative for cough, chest tightness and shortness of breath.    Cardiovascular: Positive for leg swelling (chronic). Negative for chest pain and palpitations.   Gastrointestinal: Positive for abdominal distention. Negative for abdominal pain, blood in stool,  "diarrhea, nausea and vomiting.   Endocrine: Negative for cold intolerance, polydipsia and polyuria.   Genitourinary: Negative for difficulty urinating, dysuria and pelvic pain.   Musculoskeletal: Negative for joint swelling and myalgias.   Skin: Negative for color change, pallor, rash and wound.   Neurological: Negative for dizziness, seizures and weakness.   Hematological: Negative for adenopathy. Does not bruise/bleed easily.   Psychiatric/Behavioral: Negative for agitation and behavioral problems.       Objective:     Vitals:    04/03/17 1205   BP: 110/70   Pulse: 82   Resp: 18   Temp: 97.9 °F (36.6 °C)   Weight: 139 lb 12.8 oz (63.4 kg)   Height: 62\" (157.5 cm)   PainSc: 0-No pain     Current Status 4/3/2017   ECOG score 0   Physical Exam   GENERAL:  Elderly thin female in no acute distress. Ambulating with walker  SKIN:  Warm, dry without rashes, purpura or petechiae. Mildly jaundiced  HEAD:  Normocephalic.  EYES:  EOMs intact.  Scleral icterus  EARS:  Hearing intact.  NOSE:  Septum midline.  No excoriations or nasal discharge.  CHEST:  Lungs clear to percussion and auscultation. Good airflow.  CARDIAC:  Regular rate and rhythm without murmurs, rubs or gallops. Normal S1,S2.  ABDOMEN:  Soft, nontender, no hepatosplenomegaly, normal bowel sounds, + distended with fluid wave/ascites   EXTREMITIES:  No clubbing, cyanosis ; bilateral LE edema with wrappings on lower legs. Edema improved from hospitalization.  PSYCHIATRIC:  Normal affect and mood.      Labs and Imaging  Results for orders placed or performed in visit on 04/03/17   Comprehensive Metabolic Panel   Result Value Ref Range    Glucose 227 (H) 74 - 124 mg/dL    BUN 19 6 - 20 mg/dL    Creatinine 0.93 0.60 - 1.10 mg/dL    Sodium 130 (L) 134 - 145 mmol/L    Potassium 4.9 (H) 3.5 - 4.7 mmol/L    Chloride 99 98 - 107 mmol/L    CO2 20.6 (L) 22.0 - 29.0 mmol/L    Calcium 7.9 (L) 8.5 - 10.2 mg/dL    Total Protein 5.7 (L) 6.3 - 8.0 g/dL    Albumin 2.10 (L) 3.50 - " 5.20 g/dL    ALT (SGPT) 22 0 - 33 U/L    AST (SGOT) 35 (H) 0 - 32 U/L    Alkaline Phosphatase 195 (H) 38 - 116 U/L    Total Bilirubin 4.3 (C) 0.1 - 1.2 mg/dL    eGFR Non African Amer 58 (L) >60 mL/min/1.73    Globulin 3.6 (H) 1.8 - 3.5 gm/dL    A/G Ratio 0.6 (L) 1.1 - 2.4 g/dL    BUN/Creatinine Ratio 20.4 7.3 - 30.0    Anion Gap 10.4 mmol/L   CBC Auto Differential   Result Value Ref Range    WBC 7.82 4.00 - 10.00 10*3/mm3    RBC 2.57 (L) 3.90 - 5.00 10*6/mm3    Hemoglobin 9.6 (L) 11.5 - 14.9 g/dL    Hematocrit 27.8 (L) 34.0 - 45.0 %    .2 (H) 83.0 - 97.0 fL    MCH 37.4 (H) 27.0 - 33.0 pg    MCHC 34.5 32.0 - 35.0 g/dL    RDW 19.5 (H) 11.7 - 14.5 %    RDW-SD 77.9 (H) 37.0 - 49.0 fl    MPV 10.5 8.9 - 12.1 fL    Platelets 140 (L) 150 - 375 10*3/mm3    Neutrophil % 79.5 (H) 39.0 - 75.0 %    Lymphocyte % 7.3 (L) 20.0 - 49.0 %    Monocyte % 10.4 4.0 - 12.0 %    Eosinophil % 1.9 1.0 - 5.0 %    Basophil % 0.4 0.0 - 1.1 %    Immature Grans % 0.5 0.0 - 0.5 %    Neutrophils, Absolute 6.22 1.50 - 7.00 10*3/mm3    Lymphocytes, Absolute 0.57 (L) 1.00 - 3.50 10*3/mm3    Monocytes, Absolute 0.81 (H) 0.25 - 0.80 10*3/mm3    Eosinophils, Absolute 0.15 0.00 - 0.36 10*3/mm3    Basophils, Absolute 0.03 0.00 - 0.10 10*3/mm3    Immature Grans, Absolute 0.04 (H) 0.00 - 0.03 10*3/mm3    nRBC 0.0 0.0 - 0.0 /100 WBC     CT C/A/P:  IMPRESSION:  1. The significant change is development of a moderately large volume of  ascites throughout the abdomen and pelvis. The approximately 5 cm  partially extrahepatic mass involving the hepatic flexure, distal  gastric antrum and proximal duodenum appears largely unchanged.  2. There is no significant change in the degree of intrahepatic biliary  dilatation.  3. Small pleural effusions, smaller than on the most recent CT imaging,  11/11/2016. There is vascular prominence within the chest and the chest  CT is otherwise unremarkable.    MRCP  IMPRESSION:  Suboptimal study due to significant artifact  from metallic  bile duct stents. An exophytic mass protruding inferiorly from the maurisio  hepatis to significant decrease in size since the preceding MRI of the  abdomen dated 02/13/2016. There is moderate intrahepatic ductal  dilatation despite presence of the stents. Mild splenomegaly.    Assessment/Plan   Assessment/Plan:   1. Stage IV, T3N0M1 extrahepatic cholangiocarcinoma.    - palliative [place]/Ox q 2 weeks started 3/14/16. Had to discontinue due to reaction to Oxaliplatin. Also received FOLFIRI, but had intolerable side effects.    - Currently on palliative Gemzar that's been adjusted to every other week and dose reduced for liver dysfunction. She understands that goal is palliative and that I doubt we're getting shrinkage of the tumor with such a low dose of Gemzar, but we may at least be slowing the disease process and she's feeling well thus we'll continue.   2. Nausea and vomiting. Improved with treatment for gastric ulcers. Monitor. Stable today.  3. Biliary obstruction. Improved status post ERCP (last in March 2017) with removal of calculi and debris with dramatic improvement in symptoms. Monitor.   4. Cancer related pain.   - Norco 5/325 mg every 4 hours prn. (Changed back to Norco from oxycodone)  5. Hyponatremia. Likely related to tumor/po intake. Monitor. She remains asymptomatic.  I've encouraged a salt rich diet.  6. Thrombocytopenia due to chemotherapy.  Adjusting as above.   7. Ascites. Cytology was benign, but she is high risk for malignant ascites. Monitor and prn paracentesis. Order paracentesis for later this week  8. Chills. She doesn't look toxic today. Her port looks good. Will check urine studies. I've given her a prescription for levaquin in case she develops another fever.     RTC in 2 weeks for Gemzar.

## 2017-04-08 PROBLEM — N17.9 ACUTE RENAL FAILURE (HCC): Status: ACTIVE | Noted: 2017-01-01

## 2017-04-08 NOTE — CONSULTS
Date of Consultation: 17    Referral Provider: Roque Chavez MD     Reason for Consultation:    Encounter Provider: Vikas Chin MD    Group of Service: [facility]     Patient Name: Charlee Barrera    :1935    Chief complaint:  SOA    Consulted for: atrial fib with RVR    [id number] score: 5    History of Present Illness:  Ms. Barrera is a 80 y/o patient of Dr. Lara with a documented h/o stage IV cholangiocarcinoma - being treated with palliative chemotherapy, HTN, DM, anemia, GERD and sepsis. She was last seen by Dr. Lara on 16, in consultation during an admission, for sinus tachycardia. It was felt to be related to anxiety while having an EGD and her echo was normal-see below.     She presented to the ED last pm with c/o worsening sob, fever, increased fatigue and decreased PO intake. On arrival bp was 139/73, -a fib, T 95.1 and SpOw 99% on RA. Abnormal labs showed a lactate of 4.4, plt 123, glucose 163, BUN/Creat 55/1.54, Na 129, K 6.3, Cl 96, bicarb 18, albumin 2.2, Alk Phos 219, bili 5.7, INR 1.7, CK 14 and phos 5.0.  Her urine showed 2+ bili, 3+ blood, 2+ leukocytes and no bacteria. CXR showed very small bilateral pleural effusions and an enlarged heart. She was started on IVF and IV antibiotics, then admitted for further treatment.    EKG in the ED showed atrial fib with RVR, rate of 140 and on telemetry she gets up to 160. We have been asked to see for this reason. This morning her HR is 124, bp 98/56. She has not been started on any rate control medications. Her SpO2 is 96% on RA. Labs this am show Na 132, K 5.5, BUN/Creat 48/1.3 H/H 8.0/24 (down from 11./33.4) and procalcitonin 3.4. Blood cx and urine cx are pending. She has now converted to NSR, rate in the 90s, without treatment. She is in and out of sleep and doesn't stay awake long for questioning. Her daughters are at bedside and say that she has never been told she has atrial fib, nor has she  "c/o palpitations or chest pain.         Pending tests:  Echo  CT C/A/P    Previous testing:  Echo 11/2016  · Left ventricular function is hyperdynamic (EF > 70%). Calculated EF = 66.1%. Estimated EF was in disagreement with the calculated EF. Estimated EF appears to be in the range of greater than 70%. Normal left ventricular cavity size and wall thickness noted. All left ventricular wall segments contract normally. Left ventricular diastolic dysfunction is noted (grade I) consistent with impaired relaxation.  Past Medical History:   Diagnosis Date   • Anxiety    • Arthritis     \"bad right knee\"   • Bile duct cancer     Metastatic cholangiocarcinoma    • Broken arm     LEFT   • Cancer    • Diabetes mellitus    • Diarrhea due to drug    • History of transfusion    • Hypertension    • PONV (postoperative nausea and vomiting)          Past Surgical History:   Procedure Laterality Date   • ABDOMINAL SURGERY     • CATARACT EXTRACTION Bilateral 2015   • ENDOSCOPY N/A 11/17/2016    Procedure: ESOPHAGOGASTRODUODENOSCOPY  ;  Surgeon: Herb Buckner MD;  Location: [facility] ENDOSCOPY;  Service:    • HYSTERECTOMY  1986   • [facility] ERCP DX COLLECTION SPECIMEN BRUSHING/WASHING N/A 8/8/2016    Procedure: ENDOSCOPIC RETROGRADE CHOLANGIOPANCREATOGRAPHY with balloon sweep;  Surgeon: Herb Buckner MD;  Location: [facility] ENDOSCOPY;  Service: Gastroenterology   • UT ERCP DX COLLECTION SPECIMEN BRUSHING/WASHING N/A 3/26/2017    Prior biliary endoscopic sphincterotomy appeared open, two visibly occluded stents from the biliary tree were seen in the major papilla, left main hepatic duct and left intrahepatic branches were moderately dilated, both left and right biliary tree swept and sludge was found    • UT INSJ TUNNELED CVC W/O SUBQ PORT/ AGE 6 YR/> Right 3/7/2016    Procedure: INSERTION VENOUS ACCESS DEVICE, Power Port Placement, sonogram and fluroscopy;  Surgeon: Rich Keita MD;  [place] [facility] [place] [facility]  Service: General "   • TONSILLECTOMY           Allergies   Allergen Reactions   • Tegaderm Ag Mesh [Silver] Dermatitis   • Morphine And Related Confusion   • Sulfa Antibiotics      Patient cannot remember reaction.   • Oxaliplatin Palpitations, Other (See Comments) and Angioedema     Severe flushing for head, neck and palms.  Requiring iv benadryl, iv pepcid and iv steroids (solucortef) to reverse         No current facility-administered medications on file prior to encounter.      Current Outpatient Prescriptions on File Prior to Encounter   Medication Sig Dispense Refill   • CRANBERRY PO Take 1 tablet by mouth 2 (Two) Times a Day.     • diphenoxylate-atropine (LOMOTIL) 2.5-0.025 MG per tablet Take 2 tablets by mouth Every 6 (Six) Hours As Needed for diarrhea. 120 tablet 1   • glucose blood (ACCU-CHEK ACTIVE STRIPS) test strip 1 each by Other route Daily. Use as instructed      • HYDROcodone-acetaminophen (NORCO) 5-325 MG per tablet Take 1 tablet by mouth Every 4 (Four) Hours As Needed for Moderate Pain (4-6). (Patient taking differently: Take 1 tablet by mouth Every 4 (Four) Hours As Needed for Moderate Pain (4-6) (1-2 tabs Q4-6H prn).) 90 tablet 0   • loperamide (IMODIUM) 2 MG capsule Take 1 capsule by mouth 4 (Four) Times a Day As Needed for diarrhea.  0   • magnesium chloride ER (MAG-DELAY) 535 (64 MG) MG CR tablet Take 128 mg by mouth 2 (Two) Times a Day.     • ondansetron (ZOFRAN) 8 MG tablet Take 1 tablet by mouth Every 8 (Eight) Hours As Needed for nausea or vomiting. 45 tablet 1   • pantoprazole (PROTONIX) 40 MG EC tablet Take 1 tablet by mouth Daily. 90 tablet 3   • polyethylene glycol (MIRALAX) packet Take 17 g by mouth As Needed. prn     • prochlorperazine (COMPAZINE) 10 MG tablet Take 1 tablet by mouth Every 6 (Six) Hours As Needed for Nausea or Vomiting for up to 60 doses. 60 tablet 5   • saccharomyces boulardii (FLORASTOR) 250 MG capsule Take 1 capsule by mouth 2 (Two) Times a Day.     • spironolactone (ALDACTONE) 50  "MG tablet Take 1 tablet by mouth Daily. 30 tablet 0   • sucralfate (CARAFATE) 1 G tablet Take 1 tablet by mouth 4 (Four) Times a Day. 360 tablet 3         Social History     Social History   • Marital status:      Spouse name: Carlitos   • Number of children: 4   • Years of education: High School     Occupational History   • Housewife Retired     Clothing  for [employer]     Social History Main Topics   • Smoking status: Never Smoker   • Smokeless tobacco: Never Used   • Alcohol use 1.2 - 1.8 oz/week     2 - 3 Glasses of wine per week      Comment: weekly   • Drug use: No   • Sexual activity: Not Currently     Other Topics Concern   • Not on file     Social History Narrative    Patient has never smoked. Reports she drinks about 1.2 0 1.8 oz of alcohol per week.          Family History   Problem Relation Age of Onset   • Heart disease Father    • Hypertension Mother          REVIEW OF SYSTEMS:   12 point ROS was performed and is negative except as outlined in HPI    REVIEW OF SYSTEMS:   CONSTITUTIONAL: As above  HEENT: No visual changes or hearing changes.  SKIN: No rash.   RESPIRATORY: No hemoptysis.  GASTROINTESTINAL: No melena  GENITOURINARY: No dysuria or frequency.  NEUROLOGICAL: No numbness or tingling in the extremities.   MUSCULOSKELETAL: No new joint pain.    HEMATOLOGIC: No bleeding.  LYMPHATICS: No enlarged nodes.   PSYCHIATRIC: No severe depression or anxiety.    ENDOCRINOLOGIC: No heat or cold intolerance.      Objective:     Vitals:    04/08/17 0748 04/08/17 0803 04/08/17 0818 04/08/17 0830   BP: 97/61 106/54 108/58    BP Location: Right arm      Patient Position:       Pulse: 98 95 97    Resp: 18      Temp: 97.2 °F (36.2 °C)   99.2 °F (37.3 °C)   TempSrc: Oral   Tympanic   SpO2: 96% 96% 96%    Weight:       Height:         Body mass index is 24 kg/(m^2).  Flowsheet Rows         First Filed Value    Admission Height  64\" (162.6 cm) Documented at 04/07/2017 2141    Admission Weight  139 lb " 12.8 oz (63.4 kg) Documented at 04/07/2017 2141           General:    No acute distress, awake but somnolent                   Head:    Normocephalic, atraumatic.   Eyes:          Conjunctivae and sclerae normal, no icterus, PERRLA   Throat:   No oral lesions, no thrush, oral mucosa moist.    Neck:   Supple, trachea midline.   Lungs:     Rhonci bilaterally      Heart:    Regular rhythm and normal rate. II/VI CHARIS throughout.    Abdomen:     Soft, non-tender, non-distended, positive bowel sounds.    Extremities:   Anasarca noted - LE's wrapped in ACE bandages.     Pulses:   Pulses palpable and equal bilaterally.    Skin:   No bleeding or rash.   Neuro:   Non-focal.  Moves all extremities well.    Psychiatric:   Somnolent     Lab Review:                  Results from last 7 days  Lab Units 04/08/17  0559   SODIUM mmol/L 132*   POTASSIUM mmol/L 5.7*   CHLORIDE mmol/L 103   TOTAL CO2 mmol/L 17.7*   BUN mg/dL 52*   CREATININE mg/dL 1.27*   GLUCOSE mg/dL 136*   CALCIUM mg/dL 8.1*       Results from last 7 days  Lab Units 04/08/17  0559 04/07/17  2247   CK TOTAL U/L  --  14*   TROPONIN T ng/mL <0.010 <0.010       Results from last 7 days  Lab Units 04/08/17  0559   WBC 10*3/mm3 7.62   HEMOGLOBIN g/dL 8.0*   HEMATOCRIT % 24.0*   PLATELETS 10*3/mm3 101*       Results from last 7 days  Lab Units 04/07/17  2247 04/06/17  0812   INR  1.70* 1.8*   APTT seconds 40.3*  --            Results from last 7 days  Lab Units 04/07/17  2247   MAGNESIUM mg/dL 2.3     CXR:  FINDINGS: The lungs are well-expanded and clear except for very small  bilateral pleural effusions as also noted on the chest CT scan of  03/21/2017. The heart is enlarged with some minimal upper lobe vascular  prominence. A right-sided MediPort catheter ends in the SVC.      EKG:          EKG (reviewed by me personally): Atrial fibrillation with rapid ventricular response.  There are nonspecific ST and T-wave changes.      Assessment:   1. Stage IV cholangiocarcinoma  2.  Septic shock  3. Acute kidney injury  4. Paroxysmal AF with RVR  5. Anasarca (multifactorial)  6. Hyponatremia   7. EF greater than 70%  8. Low Albumin (1.9)    Plan:       I had a discussion with the patient's family members who are in the room.  She has stage IV cholangiocarcinoma, and is getting palliative chemotherapy.  She is acutely ill currently.  Her anasarca is very likely multifactorial, and is most likely related to her low albumin of 1.9 with subsequent third spacing.  I do not feel that this is from diastolic congestive heart failure.  Her lower extremities are currently wrapped.  The paroxysmal atrial fibrillation is new, and is very likely a stress response to all of her other medical issues.  I would simply place her on a diltiazem drip for today, with transition to oral diltiazem tomorrow if possible.  I did discuss anticoagulation with the patient's family, and we all agree that this is not a good idea.  She is very ill, and is a significant fall risk at home per their assessment as well.  Therefore, we will not anticoagulate her.  We will continue to follow patient with you make recognitions accordingly.    Thank you very much for this consult.    Roberto Carlos Chin M.D.

## 2017-04-08 NOTE — H&P
"[facility]    CC: weakness, abnormal breathing    HPI:  Mrs. Barrera is an 82yo WF getting palliative chemotherapy for stage IV cholangiocarcinoma.  She has had prior episodes of sepsis.  She has been having progressive hepatosplenomegaly and abdominal distension and le edema.  She was evaluated for paracentesis a few days ago but there was no ascites seen on ultrasound of abdomen.  About three days ago she started having increased weakness and then tonight she has had abnormal breathing.  She was brought to the ER and was in Afib with RVR and apparent septic shock.  She is confused and not able to provide much further history, but multiple family members are present at bedside that contribute history and I have reviewed the chart.    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 Reports she drinks about 1.2 0 1.8 oz of alcohol per week.      Family History   Problem Relation Age of Onset   • Heart disease Father    • Hypertension Mother      ROS: difficult to obtain  Constitutional: Positive for appetite change (decreased PO intake), fatigue and fever (resolved). Negative " for chills.   HENT: Negative for sore throat and trouble swallowing.   Eyes: Negative for visual disturbance.   Respiratory: Positive for shortness of breath. Negative for cough.   Cardiovascular: Negative for chest pain, palpitations and leg swelling.   Gastrointestinal: Negative for abdominal pain,  One or two episodes of diarrhea and one episode of vomiting in er.   Endocrine: Negative.   Genitourinary: Negative for decreased urine volume, dysuria and frequency.   Musculoskeletal: Negative for neck pain.   Skin: Negative for rash.   Allergic/Immunologic: Negative.   Neurological: Negative for syncope, + weakness,  Neg numbness and headaches.   Hematological: Negative. +thrombocytopenia in the recent past  Psychiatric/Behavioral: Negative.   All other systems reviewed and are negative.    Meds: reviewed  ALL: tegaderm, morphine, sulfa, oxaliplatin    Vital Sign Min/Max for last 24 hours  Temp  Min: 95.1 °F (35.1 °C)  Max: 95.1 °F (35.1 °C)   BP  Min: 86/53  Max: 139/73   Pulse  Min: 122  Max: 138   Resp  Min: 16  Max: 20   SpO2  Min: 96 %  Max: 99 %   No Data Recorded   Weight  Min: 139 lb 12.8 oz (63.4 kg)  Max: 139 lb 12.8 oz (63.4 kg)     GEN:  appears ill, delerium, oriented times 1  HEENT: PERRL, EOMI, no icterus, mmm, no jvd, trachea midline, neck supple  CHEST: decreased ae bilat, no wheezes,+ crackles bibasilar, no use of accessory muscles  CV: irrg, tachy, no m/g/r  ABD: soft, nt, distended +bs, + hepatosplenomegaly  EXT: no c/c/ 2 + edema (wrapped)  SKIN: no rashes, no xanthomas, decrased turgor  LYMPH: no palpable cervical or supraclavicular lymphadenopathy  NEURO: CN 2-12 intact and symmetric bilaterally  PSYCH: flat affect, orientation as above, impaired judgement, anxious mood    Labs:  U/a: +wbc; mod celestino esterase  Glucose 163  Cr 1.54  Na 129  k 6.3  Bicarb 18  Albumin 2.2  Total bili 5.7  inr 1.7  Wbc 8.84  hgb 11.3  plts 123    CXR: bilateral effusions, smaller, otherwise appears clear    Ct  "chest/abd/pelvis: pending    Echo: ef 70%    A/P:  1. Severe Septic shock -- Urinary source? Will give broad spectrum coverage for now.  IV fluids, albumin, solucortef, trend lactic acid, check procalcitonin,   2. Hyperkalemia -- will repeat here shortly, temporizing measures given in ER, she may need further measures, will consult renal given multiple other derangements.  3. Hyponatremia  4. Acute kidney injury  5. Lactic acidosis  6. afib with rvr -- suspect rate will improve with increasing bp, cards to see  7. Hyperbilirubinemia  8. DM -- ssi  9. Abdominal distention -- ct abd/pelvis has been ordered  10. Pain control -- fentanyl prn  11. Nausea -- zofran prn  12. Stage IV cholangiocarcinoma -- getting palliative chemo  13. hypoalbumin -- will give some albumin  14. Bilateral pleural effusions -- likely related to overall decreased albumin  15. Bilateral le edema -- \"  \"  16. DNR    Overall prognosis is quite poor.  D/w multiple family members in ER    CC 40 mins.          "

## 2017-04-08 NOTE — CONSULTS
"  Referring Provider: Roque Chavez M.D.  Reason for Consultation: Evaluation of patient with acute kidney injury, associated with hyponatremia and hyperkalemia.    Subjective     Chief complaint   Chief Complaint   Patient presents with   • Fatigue     Increasing weakness, SOA, diarrhea.   • Shortness of Breath   • Diarrhea       History of present illness:    The patient is a 81-year-old  female was admitted with shortness of air was found to have atrial fibrillation with rapid ventricular response which converted spontaneously to normal sinus rhythm.  On admission she is noted to have creatinine of 1.54 and potassium 6.3 and sodium 129 she was treated medically and her creatinine this morning is 1.27 her sodium 132, potassium 5.7, hence nephrology consult was requested.  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  Had significant edema.  Had recent paracentesis.  Patient was given some sedation earlier so no history could be obtained from the patient the history was obtained from the chart and the family.  Past medical history include the metastatic cholangiocarcinoma, diabetes mellitus type 2 recurrent diarrhea.  And hypertension so reports to have history of arthritis  and anxiety    Past Medical History:   Diagnosis Date   • Anxiety    • Arthritis     \"bad right knee\"   • Bile duct cancer     Metastatic cholangiocarcinoma    • Broken arm     LEFT   • Cancer    • Diabetes mellitus    • Diarrhea due to drug    • History of transfusion    • Hypertension    • PONV (postoperative nausea and vomiting)      Past Surgical History:   Procedure Laterality Date   • ABDOMINAL SURGERY     • CATARACT EXTRACTION Bilateral 2015   • ENDOSCOPY N/A 11/17/2016    Procedure: ESOPHAGOGASTRODUODENOSCOPY  ;  Surgeon: Herb Buckner MD;  Location: [facility] ENDOSCOPY;  Service:    • HYSTERECTOMY  1986   • [place] ERCP DX COLLECTION SPECIMEN BRUSHING/WASHING " "N/A 8/8/2016    Procedure: ENDOSCOPIC RETROGRADE CHOLANGIOPANCREATOGRAPHY with balloon sweep;  Surgeon: Herb Buckner MD;  Location: [facility] ENDOSCOPY;  Service: Gastroenterology   • [place] ERCP DX COLLECTION SPECIMEN BRUSHING/WASHING N/A 3/26/2017    Prior biliary endoscopic sphincterotomy appeared open, two visibly occluded stents from the biliary tree were seen in the major papilla, left main hepatic duct and left intrahepatic branches were moderately dilated, both left and right biliary tree swept and sludge was found    • [place] INSJ TUNNELED CVC W/O SUBQ PORT/ AGE 6 YR/> Right 3/7/2016    Procedure: INSERTION VENOUS ACCESS DEVICE, Power Port Placement, sonogram and fluroscopy;  Surgeon: Rich Keita MD;  Location: [facility] OR [facility] [place] [facility];  Service: General   • TONSILLECTOMY       Family History   Problem Relation Age of Onset   • Heart disease Father    • Hypertension Mother        Social History   Substance Use Topics   • Smoking status: Never Smoker   • Smokeless tobacco: Never Used   • Alcohol use 1.2 - 1.8 oz/week     2 - 3 Glasses of wine per week      Comment: weekly       (Not in a hospital admission)  Allergies:  Tegaderm ag mesh [silver]; Morphine and related; Sulfa antibiotics; and Oxaliplatin    Review of Systems  The patient is very lethargic she woke up enough to denies chest pain or shortness of air but fell asleep while talking and the review of systems very unreliable.  Hence the review of systems not obtainable.    Objective     Vital Signs  Temp:  [94.1 °F (34.5 °C)-99.2 °F (37.3 °C)] 99.2 °F (37.3 °C)  Heart Rate:  [] 97  Resp:  [16-20] 18  BP: ()/(53-75) 108/58    Flowsheet Rows         First Filed Value    Admission Height  64\" (162.6 cm) Documented at 04/07/2017 2141    Admission Weight  139 lb 12.8 oz (63.4 kg) Documented at 04/07/2017 2141              I/O last 3 completed shifts:  In: 3200 [I.V.:500; IV Piggyback:2700]  Out: -     Intake/Output Summary (Last 24 hours) at " 04/08/17 0928  Last data filed at 04/08/17 0612   Gross per 24 hour   Intake             3200 ml   Output                0 ml   Net             3200 ml       Physical Exam:  General Appearance:  very lethargic due to sedation, no acute distress,  ears to be chronically ill   Skin: warm and dry, on this   HEENT: pupils round and reactive to light, oral  Koza is dry, she has icteric sclerae   Neck: supple, no JVD, trachea midline  Mirlande is breath sounds bilaterally with bilateral rhonchi , unlabored breathing effort  Heart: RRR, normal S1 and S2, no S3, no rub  Abdomen: soft, non-tender distended with probable ascites , present bowel sounds to auscultation, she had significant hepatomegaly, she had significant flank  Extremities:  4+ peripheral lower extremity edema  Joints: No significant deformities noted, no crepitation over the knees or the ankles.   Neuro:  Very lethargic, difficult to assess     Results Review:  Results for orders placed or performed during the hospital encounter of 04/07/17   Comprehensive Metabolic Panel   Result Value Ref Range    Glucose 163 (H) 65 - 99 mg/dL    BUN 55 (H) 8 - 23 mg/dL    Creatinine 1.54 (H) 0.57 - 1.00 mg/dL    Sodium 129 (L) 136 - 145 mmol/L    Potassium 6.3 (C) 3.5 - 5.2 mmol/L    Chloride 96 (L) 98 - 107 mmol/L    CO2 18.0 (L) 22.0 - 29.0 mmol/L    Calcium 8.8 8.6 - 10.5 mg/dL    Total Protein 6.8 6.0 - 8.5 g/dL    Albumin 2.20 (L) 3.50 - 5.20 g/dL    ALT (SGPT) 31 1 - 33 U/L    AST (SGOT) 40 (H) 1 - 32 U/L    Alkaline Phosphatase 219 (H) 39 - 117 U/L    Total Bilirubin 5.7 (H) 0.1 - 1.2 mg/dL    eGFR Non African Amer 32 (L) >60 mL/min/1.73    Globulin 4.6 gm/dL    A/G Ratio 0.5 g/dL    BUN/Creatinine Ratio 35.7 (H) 7.0 - 25.0    Anion Gap 15.0 mmol/L   aPTT   Result Value Ref Range    PTT 40.3 (H) 22.7 - 35.4 seconds   Urinalysis With / Culture If Indicated   Result Value Ref Range    Color, UA Colette (A) Yellow, Straw    Appearance, UA Cloudy (A) Clear    pH, UA <=5.0 5.0  - 8.0    Specific Gravity, UA 1.018 1.005 - 1.030    Glucose, UA Negative Negative    Ketones, UA Trace (A) Negative    Bilirubin, UA Moderate (2+) (A) Negative    Blood, UA Large (3+) (A) Negative    Protein, UA Negative Negative    Leuk Esterase, UA Moderate (2+) (A) Negative    Nitrite, UA Negative Negative    Urobilinogen, UA 1.0 E.U./dL 0.2 - 1.0 E.U./dL   CK   Result Value Ref Range    Creatine Kinase 14 (L) 20 - 180 U/L   Magnesium   Result Value Ref Range    Magnesium 2.3 1.6 - 2.4 mg/dL   Phosphorus   Result Value Ref Range    Phosphorus 5.0 (H) 2.5 - 4.5 mg/dL   Lactic Acid, Plasma   Result Value Ref Range    Lactate 4.4 (C) 0.5 - 2.0 mmol/L   CBC Auto Differential   Result Value Ref Range    WBC 8.84 4.50 - 10.70 10*3/mm3    RBC 3.11 (L) 3.90 - 5.20 10*6/mm3    Hemoglobin 11.3 (L) 11.9 - 15.5 g/dL    Hematocrit 33.4 (L) 35.6 - 45.5 %    .4 (H) 80.5 - 98.2 fL    MCH 36.3 (H) 26.9 - 32.0 pg    MCHC 33.8 32.4 - 36.3 g/dL    RDW 18.4 (H) 11.7 - 13.0 %    RDW-SD 72.4 (H) 37.0 - 54.0 fl    MPV 11.4 6.0 - 12.0 fL    Platelets 123 (L) 140 - 500 10*3/mm3    Neutrophil % 92.4 (H) 42.7 - 76.0 %    Lymphocyte % 6.0 (L) 19.6 - 45.3 %    Monocyte % 0.3 (L) 5.0 - 12.0 %    Eosinophil % 1.2 0.3 - 6.2 %    Basophil % 0.1 0.0 - 1.5 %    Immature Grans % 0.0 0.0 - 0.5 %    Neutrophils, Absolute 8.16 (H) 1.90 - 8.10 10*3/mm3    Lymphocytes, Absolute 0.53 (L) 0.90 - 4.80 10*3/mm3    Monocytes, Absolute 0.03 (L) 0.20 - 1.20 10*3/mm3    Eosinophils, Absolute 0.11 0.00 - 0.70 10*3/mm3    Basophils, Absolute 0.01 0.00 - 0.20 10*3/mm3    Immature Grans, Absolute 0.00 0.00 - 0.03 10*3/mm3   Acetone   Result Value Ref Range    Acetone Negative Negative   Scan Slide   Result Value Ref Range    Dacrocytes Slight/1+ None Seen    Macrocytes Mod/2+ None Seen    Toxic Granulation Slight/1+ None Seen    Platelet Morphology Normal Normal   Protime-INR   Result Value Ref Range    Protime 19.4 (H) 11.7 - 14.2 Seconds    INR 1.70 (H)  0.90 - 1.10   Lactate Acid, Reflex   Result Value Ref Range    Lactate 3.7 (C) 0.5 - 2.0 mmol/L   Urinalysis, Microscopic Only   Result Value Ref Range    RBC, UA 6-12 (A) None Seen, 0-2 /HPF    WBC, UA 6-12 (A) None Seen, 0-2 /HPF    Bacteria, UA None Seen None Seen /HPF    Squamous Epithelial Cells, UA 3-6 (A) None Seen, 0-2 /HPF    Transitional Epithelial Cells, UA 0-2 0 - 2 /HPF    Hyaline Casts, UA 3-6 None Seen /LPF    Amorphous Crystals, UA Small/1+ None Seen /HPF    Methodology Manual Light Microscopy    Troponin   Result Value Ref Range    Troponin T <0.010 0.000 - 0.030 ng/mL   Basic Metabolic Panel   Result Value Ref Range    Glucose 162 (H) 65 - 99 mg/dL    BUN 48 (H) 8 - 23 mg/dL    Creatinine 1.31 (H) 0.57 - 1.00 mg/dL    Sodium 132 (L) 136 - 145 mmol/L    Potassium 5.5 (H) 3.5 - 5.2 mmol/L    Chloride 101 98 - 107 mmol/L    CO2 17.7 (L) 22.0 - 29.0 mmol/L    Calcium 8.2 (L) 8.6 - 10.5 mg/dL    eGFR Non African Amer 39 (L) >60 mL/min/1.73    BUN/Creatinine Ratio 36.6 (H) 7.0 - 25.0    Anion Gap 13.3 mmol/L   Comprehensive Metabolic Panel   Result Value Ref Range    Glucose 136 (H) 65 - 99 mg/dL    BUN 52 (H) 8 - 23 mg/dL    Creatinine 1.27 (H) 0.57 - 1.00 mg/dL    Sodium 132 (L) 136 - 145 mmol/L    Potassium 5.7 (H) 3.5 - 5.2 mmol/L    Chloride 103 98 - 107 mmol/L    CO2 17.7 (L) 22.0 - 29.0 mmol/L    Calcium 8.1 (L) 8.6 - 10.5 mg/dL    Total Protein 5.2 (L) 6.0 - 8.5 g/dL    Albumin 1.90 (L) 3.50 - 5.20 g/dL    ALT (SGPT) 24 1 - 33 U/L    AST (SGOT) 33 (H) 1 - 32 U/L    Alkaline Phosphatase 157 (H) 39 - 117 U/L    Total Bilirubin 4.2 (H) 0.1 - 1.2 mg/dL    eGFR Non African Amer 40 (L) >60 mL/min/1.73    Globulin 3.3 gm/dL    A/G Ratio 0.6 g/dL    BUN/Creatinine Ratio 40.9 (H) 7.0 - 25.0    Anion Gap 11.3 mmol/L   CBC Auto Differential   Result Value Ref Range    WBC 7.62 4.50 - 10.70 10*3/mm3    RBC 2.29 (L) 3.90 - 5.20 10*6/mm3    Hemoglobin 8.0 (L) 11.9 - 15.5 g/dL    Hematocrit 24.0 (L) 35.6 - 45.5  %    .8 (H) 80.5 - 98.2 fL    MCH 34.9 (H) 26.9 - 32.0 pg    MCHC 33.3 32.4 - 36.3 g/dL    RDW 18.7 (H) 11.7 - 13.0 %    RDW-SD 71.6 (H) 37.0 - 54.0 fl    MPV 11.1 6.0 - 12.0 fL    Platelets 101 (L) 140 - 500 10*3/mm3    Neutrophil % 92.3 (H) 42.7 - 76.0 %    Lymphocyte % 6.0 (L) 19.6 - 45.3 %    Monocyte % 0.9 (L) 5.0 - 12.0 %    Eosinophil % 0.5 0.3 - 6.2 %    Basophil % 0.0 0.0 - 1.5 %    Immature Grans % 0.3 0.0 - 0.5 %    Neutrophils, Absolute 7.03 1.90 - 8.10 10*3/mm3    Lymphocytes, Absolute 0.46 (L) 0.90 - 4.80 10*3/mm3    Monocytes, Absolute 0.07 (L) 0.20 - 1.20 10*3/mm3    Eosinophils, Absolute 0.04 0.00 - 0.70 10*3/mm3    Basophils, Absolute 0.00 0.00 - 0.20 10*3/mm3    Immature Grans, Absolute 0.02 0.00 - 0.03 10*3/mm3    nRBC 0.0 0.0 - 0.0 /100 WBC   Troponin   Result Value Ref Range    Troponin T <0.010 0.000 - 0.030 ng/mL   Procalcitonin   Result Value Ref Range    Procalcitonin 3.40 (C) 0.10 - 0.25 ng/mL   Lactic Acid, Plasma   Result Value Ref Range    Lactate 2.9 (C) 0.5 - 2.0 mmol/L   T4, Free   Result Value Ref Range    Free T4 0.98 0.93 - 1.70 ng/dL   TSH   Result Value Ref Range    TSH 6.870 (H) 0.270 - 4.200 mIU/mL   BNP   Result Value Ref Range    proBNP 8641.0 (H) 0.0 - 1800.0 pg/mL   POC Glucose Fingerstick   Result Value Ref Range    Glucose 149 (H) 70 - 130 mg/dL   POC Glucose Fingerstick   Result Value Ref Range    Glucose 123 70 - 130 mg/dL     Imaging Results (last 72 hours)     Procedure Component Value Units Date/Time    XR Chest 2 View [[id number]] Collected:  04/07/17 9786     Updated:  04/07/17 5514    Narrative:       TWO-VIEW CHEST     HISTORY: Shortness of breath. Stage IV cholangiocarcinoma.     FINDINGS: The lungs are well-expanded and clear except for very small  bilateral pleural effusions as also noted on the chest CT scan of  03/21/2017. The heart is enlarged with some minimal upper lobe vascular  prominence. A right-sided MediPort catheter ends in the SVC.     This  report was finalized on 4/7/2017 11:53 PM by Dr. Salvatore Lobo MD.       CT Chest Without Contrast [[id number]] Collected:  04/08/17 0725     Updated:  04/08/17 0725    Narrative:       CT SCAN OF THE CHEST, ABDOMEN, AND PELVIS WITHOUT CONTRAST     HISTORY: Stage IV cholangiocarcinoma. Ascites. Abdominal distention and  fever.     The CT scan was performed as an emergency procedure through the chest,  abdomen, and pelvis without contrast and compared to the previous CT  scans of the chest, abdomen, and pelvis dated 03/21/2017. The following  findings are present:  1. In the chest, there are small bilateral pleural effusions layering  posteriorly and appearing very slightly larger since the previous exam.  There is some mild compressive atelectasis at the bases posteriorly, as  well as some mild vascular congestion. The lungs are otherwise clear.  2. There is no mediastinal or hilar or axillary adenopathy. There is no  pericardial effusion.  3. In the abdomen, there are wall stents in the left and right main  hepatic ducts and extending into the common bile duct without change.  Allowing for the absence of intravenous contrast, there appears to be  some persistent dilatation of the intrahepatic bile ducts much better  seen on the enhanced CT scan performed on 03/21/2017. No focal liver  lesions are identified. The spleen, both adrenal glands, both kidneys  are unremarkable. The pancreas is somewhat obscured by adjacent  unopacified bowel loops.  4. Again noted is an abnormal mass in the portal region measuring up to  5.2 cm compared to 5 cm on the previous study. I assume this does not  represent the gallbladder, although there are some calcifications  present within the mass. The mass is somewhat better defined when  compared to the CT scan dating back to 02/12/2016.  5. There is a large amount ascites scattered throughout the abdomen and  pelvis associated with third spacing of fluid. The volume of ascites  is  slightly improved from 03/21/2017, consistent with the fairly recent  paracentesis that was performed.  6. There is no aortic aneurysm or retroperitoneal lymphadenopathy. The  large and small bowel loops are somewhat difficult to evaluate in the  absence of oral contrast and large amount of ascites and induration of  the mesentery. There is no evidence of bowel obstruction or inflammatory  change. No abnormality is seen in the pelvis.       CT Abdomen Pelvis Without Contrast [[id number]] Collected:  04/08/17 0725     Updated:  04/08/17 0725    Narrative:       CT SCAN OF THE CHEST, ABDOMEN, AND PELVIS WITHOUT CONTRAST     HISTORY: Stage IV cholangiocarcinoma. Ascites. Abdominal distention and  fever.     The CT scan was performed as an emergency procedure through the chest,  abdomen, and pelvis without contrast and compared to the previous CT  scans of the chest, abdomen, and pelvis dated 03/21/2017. The following  findings are present:  1. In the chest, there are small bilateral pleural effusions layering  posteriorly and appearing very slightly larger since the previous exam.  There is some mild compressive atelectasis at the bases posteriorly, as  well as some mild vascular congestion. The lungs are otherwise clear.  2. There is no mediastinal or hilar or axillary adenopathy. There is no  pericardial effusion.  3. In the abdomen, there are wall stents in the left and right main  hepatic ducts and extending into the common bile duct without change.  Allowing for the absence of intravenous contrast, there appears to be  some persistent dilatation of the intrahepatic bile ducts much better  seen on the enhanced CT scan performed on 03/21/2017. No focal liver  lesions are identified. The spleen, both adrenal glands, both kidneys  are unremarkable. The pancreas is somewhat obscured by adjacent  unopacified bowel loops.  4. Again noted is an abnormal mass in the portal region measuring up to  5.2 cm compared to 5 cm  on the previous study. I assume this does not  represent the gallbladder, although there are some calcifications  present within the mass. The mass is somewhat better defined when  compared to the CT scan dating back to 02/12/2016.  5. There is a large amount ascites scattered throughout the abdomen and  pelvis associated with third spacing of fluid. The volume of ascites is  slightly improved from 03/21/2017, consistent with the fairly recent  paracentesis that was performed.  6. There is no aortic aneurysm or retroperitoneal lymphadenopathy. The  large and small bowel loops are somewhat difficult to evaluate in the  absence of oral contrast and large amount of ascites and induration of  the mesentery. There is no evidence of bowel obstruction or inflammatory  change. No abnormality is seen in the pelvis.                 cefepime 1 g Intravenous Q12H   digoxin 500 mcg Intravenous Once   famotidine 20 mg Intravenous BID   heparin (porcine) 5,000 Units Subcutaneous Q8H   hydrocortisone sodium succinate 50 mg Intravenous Q8H   insulin aspart 1-20 Units Subcutaneous Q4H   sennosides-docusate sodium 2 tablet Oral Nightly   thiamine (VITAMIN B1) IVPB 100 mg Intravenous Q12H   [START ON 4/9/2017] vancomycin 1,000 mg Intravenous Q24H       diltiaZEM 5-15 mg/hr    Pharmacy to dose vancomycin     sodium chloride 125 mL/hr Last Rate: 125 mL/hr (04/07/17 0456)       Assessment/Plan   1.  Acute kidney injury multifactorial slowly improving.  2.  Hyponatremia associated with the edema and liver disease.  3.  Hyperkalemia probably related to the prior use of Aldactone prior to admission.  But in light of the hyponatremia and hyperkalemia we need to rule out the possibilities of adrenal insufficiency even though the CT scan today showed no evidence of invasion to the adrenal glands  4.  Type IV RTA  5.  Stage IV cholangiocarcinoma receiving palliative chemotherapy  6.  Anasarca associated with her liver disease and the  hypoalbuminemia      Plan:  1.  Try to diurese cautiously with the Bumex 4 mg ×1 today  2.  Add sodium bicarbonate 1300 mg by mouth 3 times a day  3.  Chronic random urine for sodium, chloride and protein to creatinine ratio. Will check cortisol level  4.  Surveillance labs, will use uric acid as a marker for intravascular  Volume status.  5.  The prognosis is very poor    Thank you Dr. Castellano for asking me to see one of the patient in consultation      I discussed the patients findings and my recommendations with  the patient's family    Singh Cannon MD  04/08/17  9:28 AM

## 2017-04-08 NOTE — PROGRESS NOTES
SEPTIC SHOCK FOCUSED EXAM    *Must be completed by a licensed independent Practitioner within 6 hours of diagnosis for the following conditions -- Septic Shock or Severe Sepsis with Lactate >/= 4.    Fluid bolus must be completed prior to assessment.      Diagnosis: Septic Shock     Vital Signs (Attestation) Reviewed Temp, HR, RR, BP   General ill appearing   Respiratory crackles   Cardiac/Chest irregularly irregular   Skin (documentation of skin color is required) warm/dry   Capillary Refill delayed   Peripheral Pulses Checked                          radial  palpable     † Septic shock is defined by CMS as severe sepsis plus one of the following: persistent hypotension after fluid bolus OR tissue hypoperfusion (Lactic Acid ?4)  †† TWO OF THE FOLLOWING can be done in lieu of the Focused Exam: Measure CVP; Measure ScVO2; Bedside cardiovascular ultrasound; Dynamic assessment of fluid responsiveness with passive leg raise or fluid challenge.      Roque Chavez MD  04/08/17  1:21 AM

## 2017-04-08 NOTE — PROGRESS NOTES
Dr. ISIDRA Campbell    [facility] CORONARY CARE    4/8/2017    Patient ID:  Name:  Charlee Barrera  MRN:  [id number]  1935  81 y.o.  female            CC/Reason for visit: Severe sepsis with shock, acute kidney injury, atrial fibrillation, stage IV cholangiocarcinoma,    HPI: Patient is doing very poorly.  Her blood pressure is again low.  She received 4 mg of IV Bumex in the emergency room, although she is diagnosed with severe sepsis and septic shock.  I had to restart her fluid bolus wide open, normal saline.  She is now in the critical care unit.  She has very advanced cholangiocarcinoma, and most likely has sepsis coming from the biliary system.    Vitals:  Vitals:    04/08/17 0818 04/08/17 0933 04/08/17 1059 04/08/17 1103   BP: 108/58 113/60  107/52   BP Location:       Patient Position:       Pulse: 97 91  87   Resp:       Temp:  96.3 °F (35.7 °C) 96.4 °F (35.8 °C)    TempSrc:  Oral Tympanic    SpO2: 96%   98%   Weight:       Height:               Body mass index is 24 kg/(m^2).    Intake/Output Summary (Last 24 hours) at 04/08/17 1132  Last data filed at 04/08/17 0949   Gross per 24 hour   Intake             4200 ml   Output                0 ml   Net             4200 ml       Exam:  GEN:  Appears acutely ill.  In no distress at this time  ENT:    External ears/nose normal, OP clear  NECK:  Supple, midline trachea  LUNGS: Breath sounds bilaterally, no wheezing, nonlabored breathing  CV:  Normal S1S2, without murmur, no edema  ABD:  Tender, palpable abdominal mass, distended.  EXT:  No cyanosis or clubbing    Scheduled meds:    cefepime 1 g Intravenous Q12H   digoxin 500 mcg Intravenous Once   [START ON 4/9/2017] famotidine 20 mg Intravenous Daily   heparin (porcine) 5,000 Units Subcutaneous Q8H   hydrocortisone sodium succinate 50 mg Intravenous Q8H   insulin aspart 1-20 Units Subcutaneous Q4H   sennosides-docusate sodium 2 tablet Oral Nightly   sodium bicarbonate 1,300 mg Oral TID   sodium  chloride 1,000 mL Intravenous Once   thiamine (VITAMIN B1) IVPB 100 mg Intravenous Q12H   [START ON 4/9/2017] vancomycin 1,000 mg Intravenous Q24H     IV meds:                        diltiaZEM 5-15 mg/hr Last Rate: 5 mg/hr (04/08/17 1012)   norepinephrine 0.02-0.3 mcg/kg/min    Pharmacy to dose vancomycin     sodium chloride 125 mL/hr Last Rate: Stopped (04/08/17 1047)   vasopressin 0.03 Units/min        Data Review:   I reviewed the patient's medications and new clinical results.  Lab Results   Component Value Date    CALCIUM 8.1 (L) 04/08/2017    PHOS 5.0 (H) 04/07/2017     Results from last 7 days  Lab Units 04/08/17  0559 04/08/17  0058 04/07/17  2247 04/07/17  2246 04/03/17  1151   AST (SGOT) U/L 33*  --  40*  --  35*   MAGNESIUM mg/dL  --   --  2.3  --  1.5*   SODIUM mmol/L 132* 132* 129*  --  130*   POTASSIUM mmol/L 5.7* 5.5* 6.3*  --  4.9*   CHLORIDE mmol/L 103 101 96*  --  99   TOTAL CO2 mmol/L 17.7* 17.7* 18.0*  --  20.6*   BUN mg/dL 52* 48* 55*  --  19   CREATININE mg/dL 1.27* 1.31* 1.54*  --  0.93   GLUCOSE mg/dL 136* 162* 163*  --  227*   CALCIUM mg/dL 8.1* 8.2* 8.8  --  7.9*   WBC 10*3/mm3 7.62  --   --  8.84 7.82   HEMOGLOBIN g/dL 8.0*  --   --  11.3* 9.6*   PLATELETS 10*3/mm3 101*  --   --  123* 140*   ALT (SGPT) U/L 24  --  31  --  22         ASSESSMENT:   Severe septic shock  Probable ascending cholangitis  Acute kidney injury  Lactic acidosis  Atrial fibrillation, probably stress reaction from sepsis  Hyperbilirubinemia  Ascites  Stage IV cholangiocarcinoma  Malnutrition  Bilateral pleural effusions  Hypokalemia  DNR/DNI    PLAN:  This patient is extremely ill.  She has very poor prognosis.  We will continue supportive care with antibiotics, fluids and hope for some response and improvement in her sepsis.  I will recheck lactic acid.  Her electrolytes are improving after IV fluid bolus.  We will avoid all diuretics.  The patient is extremely malnourished and has stage IV advanced  cholangiocarcinoma.    I feel that her atrial fibrillation is a stress reaction due to sepsis.  I will stop the digoxin and diltiazem because she is currently in shock, requiring wide open IV fluid bolus of normal saline.    Total critical care time 35 minutes, excluding any separately billable procedure time        Nash Campbell MD  4/8/2017

## 2017-04-08 NOTE — PROGRESS NOTES
"History:     Reason for Admission: sepsis liekly realted to cholangitis in the background of cholangiocarcinoma.  1. Stage IV, T3N0M1 cholangiocarcinoma.   2. Hospital follow up       HPI:   This patient was brought yesterday by the family to the emergency room with several days history of profound weakness and decline associated with changes in mental status, anorexia, nausea and some abdominal pain. At the time of the visit in the emergency room, the patient was hypotensive, tachycardic with a low grade temperature and it was thought that she was septic. She had a significant bump in her creatinine and she remained jaundiced. It was thought that she was again septic, probably a urinary tract infection, retrospectively. Somebody with cholangiocarcinoma typically has cholangitis even though most of the manifestations of this are not clinically that apparent. In any event the patient has declined to the point that she has required total care at home by her daughters and she is barely able to go to the bathroom on her own anymore. She requires feeding, dressing, bathing, and so forth. Her performance status has deteriorated. The patient has some cough and mucoid sputum production. Urinary output has decreased. Urine is very dark and she has mild abdominal pain. She has not had any rashes in the skin and she has remained jaundiced. At the time of the visit to the emergency room, again, her creatinine was elevated. She was jaundiced. Her bilirubin was elevated. Her sodium was low. Her potassium was high and she has been seen by a multitude of specialists including critical care, nephrology and cardiology this morning.         Past Medical History:   Diagnosis Date   • Anxiety    • Arthritis     \"bad right knee\"   • Bile duct cancer     Metastatic cholangiocarcinoma    • Broken arm     LEFT   • Cancer    • Diabetes mellitus    • Diarrhea due to drug    • History of transfusion    • Hypertension    • PONV (postoperative " nausea and vomiting)     and   Patient Active Problem List   Diagnosis   • Cholangiocarcinoma   • Hyperbilirubinemia   • Type 2 diabetes mellitus without complication   • Hyperglycemia   • Cancer related pain   • Fever and chills   • Chemotherapy-induced thrombocytopenia   • Anemia associated with chemotherapy   • Hypomagnesemia   • Hypokalemia   • Closed fracture of left olecranon process   • Wrist swelling   • Closed fracture of humerus   • Encounter for adjustment or management of vascular access device   • Bile duct obstruction   • Neutropenic fever   • Weakness   • Generalized weakness   • Emesis, persistent   • Acute UTI   • GERD (gastroesophageal reflux disease)   • Acute renal failure     Social History   Social History     Social History   • Marital status:      Spouse name: Carlitos   • Number of children: 4   • Years of education: High School     Occupational History   • 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 Reports she drinks about 1.2 0 1.8 oz of alcohol per week.      Family History  Family History   Problem Relation Age of Onset   • Heart disease Father    • Hypertension Mother      Allergies  Allergies   Allergen Reactions   • Tegaderm Ag Mesh [Silver] Dermatitis   • Morphine And Related Confusion   • Sulfa Antibiotics      Patient cannot remember reaction.   • Oxaliplatin Palpitations, Other (See Comments) and Angioedema     Severe flushing for head, neck and palms.  Requiring iv benadryl, iv pepcid and iv steroids (solucortef) to reverse       Medications: The current medication list was reviewed in the EMR.    Review of Systems  Review of  Systems   Constitutional: Positive for appetite change, chills and fatigue. Negative for diaphoresis and fever.   HENT: Negative for congestion, ear pain, sinus pressure, tinnitus and trouble swallowing.    Eyes: Negative for discharge, redness, itching and visual disturbance.   Respiratory: Negative for cough, chest tightness and shortness of breath.    Cardiovascular: Positive for leg swelling (chronic). Negative for chest pain and palpitations.   Gastrointestinal: Positive for abdominal distention. Negative for abdominal pain, blood in stool, diarrhea, nausea and vomiting.   Endocrine: Negative for cold intolerance, polydipsia and polyuria.   Genitourinary: Negative for difficulty urinating, dysuria and pelvic pain.   Musculoskeletal: Negative for joint swelling and myalgias.   Skin: Negative for color change, pallor, rash and wound.   Neurological: Negative for dizziness, seizures and weakness.   Hematological: Negative for adenopathy. Does not bruise/bleed easily.   Psychiatric/Behavioral: Negative for agitation and behavioral problems.       Objective:     Vitals:    04/08/17 0818 04/08/17 0933 04/08/17 1059 04/08/17 1103   BP: 108/58 113/60  107/52   BP Location:       Patient Position:       Pulse: 97 91  87   Resp:       Temp:  96.3 °F (35.7 °C) 96.4 °F (35.8 °C)    TempSrc:  Oral Tympanic    SpO2: 96%   98%   Weight:       Height:         Current Status 4/3/2017   ECOG score 0   Physical Exam ecog 3 on 4/8/17  GENERAL:  Elderly thin female in no acute distress.profound weakness  SKIN:  Warm, dry without rashes, purpura or petechiae.  Jaundiced, pale  HEAD:  Normocephalic.  EYES:  EOMs intact.  Scleral icterus  EARS:  Hearing intact.  NOSE:  Septum midline.  No excoriations or nasal discharge.  CHEST:  Lungs clear to percussion and auscultation. Good airflow.  CARDIAC:  Regular rate and rhythm without murmurs, rubs or gallops. Normal S1,S2.  ABDOMEN:  Soft, nontender, no hepatosplenomegaly, normal bowel  sounds, + distended with fluid wave/ascites   EXTREMITIES:  No clubbing, cyanosis ; bilateral LE edema with wrappings on lower legs. PSYCHIATRIC:  Normal affect and mood.    Neuro: responsive to verbal commands, profound weakness.  Labs and Imaging  Results for orders placed or performed during the hospital encounter of 04/07/17   Blood Culture   Result Value Ref Range    Blood Culture No growth at less than 24 hours    Blood Culture   Result Value Ref Range    Blood Culture No growth at less than 24 hours    Comprehensive Metabolic Panel   Result Value Ref Range    Glucose 163 (H) 65 - 99 mg/dL    BUN 55 (H) 8 - 23 mg/dL    Creatinine 1.54 (H) 0.57 - 1.00 mg/dL    Sodium 129 (L) 136 - 145 mmol/L    Potassium 6.3 (C) 3.5 - 5.2 mmol/L    Chloride 96 (L) 98 - 107 mmol/L    CO2 18.0 (L) 22.0 - 29.0 mmol/L    Calcium 8.8 8.6 - 10.5 mg/dL    Total Protein 6.8 6.0 - 8.5 g/dL    Albumin 2.20 (L) 3.50 - 5.20 g/dL    ALT (SGPT) 31 1 - 33 U/L    AST (SGOT) 40 (H) 1 - 32 U/L    Alkaline Phosphatase 219 (H) 39 - 117 U/L    Total Bilirubin 5.7 (H) 0.1 - 1.2 mg/dL    eGFR Non African Amer 32 (L) >60 mL/min/1.73    Globulin 4.6 gm/dL    A/G Ratio 0.5 g/dL    BUN/Creatinine Ratio 35.7 (H) 7.0 - 25.0    Anion Gap 15.0 mmol/L   aPTT   Result Value Ref Range    PTT 40.3 (H) 22.7 - 35.4 seconds   Urinalysis With / Culture If Indicated   Result Value Ref Range    Color, UA Colette (A) Yellow, Straw    Appearance, UA Cloudy (A) Clear    pH, UA <=5.0 5.0 - 8.0    Specific Gravity, UA 1.018 1.005 - 1.030    Glucose, UA Negative Negative    Ketones, UA Trace (A) Negative    Bilirubin, UA Moderate (2+) (A) Negative    Blood, UA Large (3+) (A) Negative    Protein, UA Negative Negative    Leuk Esterase, UA Moderate (2+) (A) Negative    Nitrite, UA Negative Negative    Urobilinogen, UA 1.0 E.U./dL 0.2 - 1.0 E.U./dL   CK   Result Value Ref Range    Creatine Kinase 14 (L) 20 - 180 U/L   Magnesium   Result Value Ref Range    Magnesium 2.3 1.6 - 2.4  mg/dL   Phosphorus   Result Value Ref Range    Phosphorus 5.0 (H) 2.5 - 4.5 mg/dL   Lactic Acid, Plasma   Result Value Ref Range    Lactate 4.4 (C) 0.5 - 2.0 mmol/L   CBC Auto Differential   Result Value Ref Range    WBC 8.84 4.50 - 10.70 10*3/mm3    RBC 3.11 (L) 3.90 - 5.20 10*6/mm3    Hemoglobin 11.3 (L) 11.9 - 15.5 g/dL    Hematocrit 33.4 (L) 35.6 - 45.5 %    .4 (H) 80.5 - 98.2 fL    MCH 36.3 (H) 26.9 - 32.0 pg    MCHC 33.8 32.4 - 36.3 g/dL    RDW 18.4 (H) 11.7 - 13.0 %    RDW-SD 72.4 (H) 37.0 - 54.0 fl    MPV 11.4 6.0 - 12.0 fL    Platelets 123 (L) 140 - 500 10*3/mm3    Neutrophil % 92.4 (H) 42.7 - 76.0 %    Lymphocyte % 6.0 (L) 19.6 - 45.3 %    Monocyte % 0.3 (L) 5.0 - 12.0 %    Eosinophil % 1.2 0.3 - 6.2 %    Basophil % 0.1 0.0 - 1.5 %    Immature Grans % 0.0 0.0 - 0.5 %    Neutrophils, Absolute 8.16 (H) 1.90 - 8.10 10*3/mm3    Lymphocytes, Absolute 0.53 (L) 0.90 - 4.80 10*3/mm3    Monocytes, Absolute 0.03 (L) 0.20 - 1.20 10*3/mm3    Eosinophils, Absolute 0.11 0.00 - 0.70 10*3/mm3    Basophils, Absolute 0.01 0.00 - 0.20 10*3/mm3    Immature Grans, Absolute 0.00 0.00 - 0.03 10*3/mm3   Acetone   Result Value Ref Range    Acetone Negative Negative   Scan Slide   Result Value Ref Range    Dacrocytes Slight/1+ None Seen    Macrocytes Mod/2+ None Seen    Toxic Granulation Slight/1+ None Seen    Platelet Morphology Normal Normal   Protime-INR   Result Value Ref Range    Protime 19.4 (H) 11.7 - 14.2 Seconds    INR 1.70 (H) 0.90 - 1.10   Lactate Acid, Reflex   Result Value Ref Range    Lactate 3.7 (C) 0.5 - 2.0 mmol/L   Urinalysis, Microscopic Only   Result Value Ref Range    RBC, UA 6-12 (A) None Seen, 0-2 /HPF    WBC, UA 6-12 (A) None Seen, 0-2 /HPF    Bacteria, UA None Seen None Seen /HPF    Squamous Epithelial Cells, UA 3-6 (A) None Seen, 0-2 /HPF    Transitional Epithelial Cells, UA 0-2 0 - 2 /HPF    Hyaline Casts, UA 3-6 None Seen /LPF    Amorphous Crystals, UA Small/1+ None Seen /HPF    Methodology Manual  Light Microscopy    Troponin   Result Value Ref Range    Troponin T <0.010 0.000 - 0.030 ng/mL   Basic Metabolic Panel   Result Value Ref Range    Glucose 162 (H) 65 - 99 mg/dL    BUN 48 (H) 8 - 23 mg/dL    Creatinine 1.31 (H) 0.57 - 1.00 mg/dL    Sodium 132 (L) 136 - 145 mmol/L    Potassium 5.5 (H) 3.5 - 5.2 mmol/L    Chloride 101 98 - 107 mmol/L    CO2 17.7 (L) 22.0 - 29.0 mmol/L    Calcium 8.2 (L) 8.6 - 10.5 mg/dL    eGFR Non African Amer 39 (L) >60 mL/min/1.73    BUN/Creatinine Ratio 36.6 (H) 7.0 - 25.0    Anion Gap 13.3 mmol/L   Comprehensive Metabolic Panel   Result Value Ref Range    Glucose 136 (H) 65 - 99 mg/dL    BUN 52 (H) 8 - 23 mg/dL    Creatinine 1.27 (H) 0.57 - 1.00 mg/dL    Sodium 132 (L) 136 - 145 mmol/L    Potassium 5.7 (H) 3.5 - 5.2 mmol/L    Chloride 103 98 - 107 mmol/L    CO2 17.7 (L) 22.0 - 29.0 mmol/L    Calcium 8.1 (L) 8.6 - 10.5 mg/dL    Total Protein 5.2 (L) 6.0 - 8.5 g/dL    Albumin 1.90 (L) 3.50 - 5.20 g/dL    ALT (SGPT) 24 1 - 33 U/L    AST (SGOT) 33 (H) 1 - 32 U/L    Alkaline Phosphatase 157 (H) 39 - 117 U/L    Total Bilirubin 4.2 (H) 0.1 - 1.2 mg/dL    eGFR Non African Amer 40 (L) >60 mL/min/1.73    Globulin 3.3 gm/dL    A/G Ratio 0.6 g/dL    BUN/Creatinine Ratio 40.9 (H) 7.0 - 25.0    Anion Gap 11.3 mmol/L   CBC Auto Differential   Result Value Ref Range    WBC 7.62 4.50 - 10.70 10*3/mm3    RBC 2.29 (L) 3.90 - 5.20 10*6/mm3    Hemoglobin 8.0 (L) 11.9 - 15.5 g/dL    Hematocrit 24.0 (L) 35.6 - 45.5 %    .8 (H) 80.5 - 98.2 fL    MCH 34.9 (H) 26.9 - 32.0 pg    MCHC 33.3 32.4 - 36.3 g/dL    RDW 18.7 (H) 11.7 - 13.0 %    RDW-SD 71.6 (H) 37.0 - 54.0 fl    MPV 11.1 6.0 - 12.0 fL    Platelets 101 (L) 140 - 500 10*3/mm3    Neutrophil % 92.3 (H) 42.7 - 76.0 %    Lymphocyte % 6.0 (L) 19.6 - 45.3 %    Monocyte % 0.9 (L) 5.0 - 12.0 %    Eosinophil % 0.5 0.3 - 6.2 %    Basophil % 0.0 0.0 - 1.5 %    Immature Grans % 0.3 0.0 - 0.5 %    Neutrophils, Absolute 7.03 1.90 - 8.10 10*3/mm3     Lymphocytes, Absolute 0.46 (L) 0.90 - 4.80 10*3/mm3    Monocytes, Absolute 0.07 (L) 0.20 - 1.20 10*3/mm3    Eosinophils, Absolute 0.04 0.00 - 0.70 10*3/mm3    Basophils, Absolute 0.00 0.00 - 0.20 10*3/mm3    Immature Grans, Absolute 0.02 0.00 - 0.03 10*3/mm3    nRBC 0.0 0.0 - 0.0 /100 WBC   Troponin   Result Value Ref Range    Troponin T <0.010 0.000 - 0.030 ng/mL   Procalcitonin   Result Value Ref Range    Procalcitonin 3.40 (C) 0.10 - 0.25 ng/mL   Lactic Acid, Plasma   Result Value Ref Range    Lactate 2.9 (C) 0.5 - 2.0 mmol/L   T3, Free   Result Value Ref Range    T3, Free 1.49 (L) 2.00 - 4.40 pg/mL   T4, Free   Result Value Ref Range    Free T4 0.98 0.93 - 1.70 ng/dL   TSH   Result Value Ref Range    TSH 6.870 (H) 0.270 - 4.200 mIU/mL   BNP   Result Value Ref Range    proBNP 8641.0 (H) 0.0 - 1800.0 pg/mL   Chloride, Urine, Random   Result Value Ref Range    Chloride, Urine 34 mmol/L   Sodium, Urine, Random   Result Value Ref Range    Sodium, Urine <20 mmol/L   Protein / Creatinine Ratio, Urine   Result Value Ref Range    Protein/Creatinine Ratio, Urine 518.6 mg/G Crea    Creatinine, Urine 115.7 mg/dL    Total Protein, Urine 60.0 mg/dL   POC Glucose Fingerstick   Result Value Ref Range    Glucose 149 (H) 70 - 130 mg/dL   POC Glucose Fingerstick   Result Value Ref Range    Glucose 123 70 - 130 mg/dL   POC Glucose Fingerstick   Result Value Ref Range    Glucose 114 70 - 130 mg/dL     Assessment/Plan   Assessment/Plan:   1. This patient was admitted yesterday through the emergency room with several days history of profound deterioration in performance status requiring total care at home, anorexia, progressive jaundice, profound fatigue. She was found to be hypotensive, tachycardic and septic in the emergency room with acute kidney injury. Her bilirubin remains elevated. Obviously the patient has had tremendous difficulty during the last several weeks after the last cycle of chemotherapy and this just has an  indicator of declining performance status, progressive malignancy and probably a component of cholangitis.     Typically, sepsis associated with patients with cholangiocarcinoma is an indicator of cholangitis even though the clinical picture is not that dramatic in the overall picture in regard to abdominal pain and things of this nature. With so much manipulation of the biliary tree it is very common for this to be the final issue.     The patient is receiving IV fluids, IV antibiotics and she has a conditional code. I agree with this completely. I had a lengthy conversation with the 2 daughters that the patient has here at the bedside. I indicated to them that this is the harbinger of terminal disease, the likelihood that the patient will recover from this is very low and obviously under these circumstances the patient will not be able to entertain any further chemotherapy treatments. My suggestion will be to keep her 1 more day in the ICU and subsequently transfer her to palliative care where hospice will be consulted.     The daughters are willing to proceed in this way and they were relieved that this conversation took place.     In the meantime I agree with the plan of care that has been in place by critical care team. I appreciate the consultants including cardiology and nephrology in her case.

## 2017-04-08 NOTE — PLAN OF CARE
Problem: Pain, Acute (Adult)  Goal: Identify Related Risk Factors and Signs and Symptoms  Outcome: Ongoing (interventions implemented as appropriate)  Goal: Acceptable Pain Control/Comfort Level  Outcome: Ongoing (interventions implemented as appropriate)    Problem: Patient Care Overview (Adult)  Goal: Plan of Care Review  Outcome: Ongoing (interventions implemented as appropriate)    04/08/17 5756   Coping/Psychosocial Response Interventions   Plan Of Care Reviewed With patient;family   Patient Care Overview   Progress progress towards functional goals is fair   Outcome Evaluation   Outcome Summary/Follow up Plan Pt given frequent fentanyl for pain, anbiotics, plan for palliative care tomorrow.         Problem: Fall Risk (Adult)  Goal: Identify Related Risk Factors and Signs and Symptoms  Outcome: Ongoing (interventions implemented as appropriate)  Goal: Absence of Falls  Outcome: Ongoing (interventions implemented as appropriate)

## 2017-04-08 NOTE — SIGNIFICANT NOTE
"   04/08/17 1132   Rehab Treatment   Discipline physical therapist   Rehab Evaluation   Evaluation Not Performed unable to evaluate, medical status change  (tsf ED to CCU. noted \"prognosis poor\" per chart. will hold today pending med status. )   Recommendation   PT - Next Appointment 04/10/17     "

## 2017-04-08 NOTE — ED NOTES
Dr. Chavez notified of procalcitonin 3.4. No orders received.     Stormy Morgan RN  04/08/17 021

## 2017-04-08 NOTE — ED NOTES
Patient has wound on left lower leg according to patients daughters. Patient came in with and has bilateral legs wrapped with ace wrap to help with dependent edema (pitting +4) that was done by daughters.      Pepper Schwartz RN  04/08/17 4794

## 2017-04-08 NOTE — PROGRESS NOTES
"Pharmacy to Dose Vancomycin    Patient: Charlee Barrera (,  LOS: 0 days )  Relevant clinical data and objective history reviewed:  81 y.o. female 64\" (162.6 cm) 139 lb 12.8 oz (63.4 kg)  Body mass index is 24 kg/(m^2).  she has a past medical history of Anxiety; Arthritis; Bile duct cancer; Broken arm; Cancer; Diabetes mellitus; Diarrhea due to drug; History of transfusion; Hypertension; and PONV (postoperative nausea and vomiting).    Day #1  Consult for Dr Chavez  Indication: pneumonia    Cultures:  Date Collection site Results      blood x 2  sent      urine  sent     Radiology:  Date Location/type Results      CXR  very small bilateral pleural effusions              Other Abx: cefepime (renally adjusted)    Evaluation of prior vancomycin  levels:   Date Type/value Comment    3/21/2016  trough = 7.7  dose = 1000 mg IV q12h             Results from last 7 days  Lab Units 17  2246 17  1151   WBC 10*3/mm3 8.84 7.82   HEMOGLOBIN g/dL 11.3* 9.6*   HEMATOCRIT % 33.4* 27.8*   PLATELETS 10*3/mm3 123* 140*     Temp (24hrs), Av.6 °F (34.8 °C), Min:94.1 °F (34.5 °C), Max:95.1 °F (35.1 °C)    CREATININE: 1.31 mg/dL ABNORMAL (17 0058)  Estimated creatinine clearance: 29.6 mL/min    Assessment/Plan:   vanco 1250 mg IV x1 at  0206    1) Vancomycin 1000 mg IV q24h. CBC/ CMP in AM per MD order.    Carlos Patino, PharmD  17 4:56 AM    "

## 2017-04-08 NOTE — PROGRESS NOTES
"Pharmacokinetic Consult - Vancomycin Dosing (Follow-up Note)    Charlee Barrera is a 81 y.o. female who is on day 1 pharmacy to dose vancomycin for PNA.  Pharmacy dosing vancomycin per Dr. Chavez's request.   Other antimicrobials: cefepime 1gm q12h  Goal trough: 15-20 mg/L    Current Vancomycin dose:1gm q24h    HPI:patient is likely going full palliative tomorrow per nursing. I did order a random today since they got a dose last night but patient family refused so dc random and just continue current regimen. Likely will dc tomorrow    Relevant clinical data and objective history reviewed:  64\" (162.6 cm)  139 lb 12.8 oz (63.4 kg)  Body mass index is 24 kg/(m^2).     She  has a past medical history of Anxiety; Arthritis; Bile duct cancer; Broken arm; Cancer; Diabetes mellitus; Diarrhea due to drug; History of transfusion; Hypertension; and PONV (postoperative nausea and vomiting).    Allergies as of 04/07/2017 - Jose Martin as Reviewed 04/07/2017   Allergen Reaction Noted   • Tegaderm ag mesh [silver] Dermatitis 03/27/2017   • Morphine and related Confusion 11/17/2016   • Sulfa antibiotics  02/23/2016   • Oxaliplatin Palpitations, Other (See Comments), and Angioedema 08/12/2016     Vital Signs (last 24 hours)       04/07 0700  -  04/08 0659 04/08 0700  -  04/08 1358   Most Recent    Temp (°F) 94.1 -  95.1    96.3 -  97.2     96.4 (35.8)    Heart Rate 115 -  (!)140    85 -  98     87    Resp 16 -  20      18     18    BP (!)80/54 -  139/73    (!)87/55 -  115/55     115/55    SpO2 (%) 95 -  100    96 -  100     97          Estimated Creatinine Clearance: 30.5 mL/min (by C-G formula based on Cr of 1.27).    Results from last 7 days  Lab Units 04/08/17  0559 04/08/17  0058 04/07/17  2247   CREATININE mg/dL 1.27* 1.31* 1.54*     Lab Results   Component Value Date    WBC 7.62 04/08/2017         Assessment/Plan  81 y.o.,female, 139 lb 12.8 oz (63.4 kg), treating PNA with vancomycin. Pt rec'd 1250mg in ER last night. Then started " 1000mg q24h to start in early morning. I ordered a random this afternoon to check since crcl 30ml/min and pt advance age. However patient family members refused lab draw (given possible transition to palliative care) per nursing, so I discontinued the level. Will continue with 1000mg q24h as appropriate for current renal function. No level necessary at this time unless vancomycin is continued.    1. Vancomycin 1000mg q24h      Pharmacy will continue to follow daily while on vancomycin and adjust as needed.     Mian Manzo, Pharm.D  4/8/2017 1:58 PM

## 2017-04-09 NOTE — NURSING NOTE
"Pt daughters upset due to heart rate afib has conflicted feelings about rather or not pt would benefit from resume cardizem drip . Reports it was just taken away earlier and we never discussed if we  Would want it restarted. Call to Dr De Leon with [facility] who speaks with daughter who is MD. Consensus at this time is \"less is more\" in this case.No orders for cardizem or lanoxin . Daughters do not want me to turn off the monitor in the room.   "

## 2017-04-09 NOTE — PROGRESS NOTES
I was informed by the nursing staff that the patient is made for palliative care.  I agree with the plan.  Nothing else to add to the current treatment.      Singh Cannon M.D.  04/09/17   10:14 AM

## 2017-04-09 NOTE — PROGRESS NOTES
Dr. ISIDRA Campbell    [facility] CARE    4/9/2017    Patient ID:  Name:  Charlee Barrera  MRN:  [id number]  1935  81 y.o.  female            CC/Reason for visit:  Follow-up for sepsis, cholangitis, terminal cholangiocarcinoma    HPI: The patient has abdominal pain.  She is in  some discomfort, and her blood pressure has been low.    Vitals:  Vitals:    04/08/17 2055 04/08/17 2203 04/09/17 0702 04/09/17 0802   BP: 96/64 107/75 99/54 (!) 78/59   BP Location:       Patient Position:       Pulse: 120      Resp:       Temp:       TempSrc:       SpO2: 99% 97% 97% 92%   Weight:       Height:               Body mass index is 24 kg/(m^2).    Intake/Output Summary (Last 24 hours) at 04/09/17 1014  Last data filed at 04/08/17 2345   Gross per 24 hour   Intake             2058 ml   Output                0 ml   Net             2058 ml       Exam:  GEN:  Appears chronically ill  LUNGS: Diminished breath sounds bilaterally, nonlabored breathing  CV:  Normal S1S2, without murmur, no edema  ABD:  Tender abdomen Scheduled meds:     IV meds:                        sodium chloride 30 mL/hr Last Rate: 125 mL/hr (04/09/17 0048)       Data Review:   I reviewed the patient's medications and new clinical results.  Lab Results   Component Value Date    CALCIUM 8.1 (L) 04/08/2017    PHOS 5.0 (H) 04/07/2017     Results from last 7 days  Lab Units 04/08/17  0559 04/08/17  0058 04/07/17  2247 04/07/17  2246 04/03/17  1151   AST (SGOT) U/L 33*  --  40*  --  35*   MAGNESIUM mg/dL  --   --  2.3  --  1.5*   SODIUM mmol/L 132* 132* 129*  --  130*   POTASSIUM mmol/L 5.7* 5.5* 6.3*  --  4.9*   CHLORIDE mmol/L 103 101 96*  --  99   TOTAL CO2 mmol/L 17.7* 17.7* 18.0*  --  20.6*   BUN mg/dL 52* 48* 55*  --  19   CREATININE mg/dL 1.27* 1.31* 1.54*  --  0.93   GLUCOSE mg/dL 136* 162* 163*  --  227*   CALCIUM mg/dL 8.1* 8.2* 8.8  --  7.9*   WBC 10*3/mm3 7.62  --   --  8.84 7.82   HEMOGLOBIN g/dL 8.0*  --   --  11.3* 9.6*   PLATELETS  10*3/mm3 101*  --   --  123* 140*   ALT (SGPT) U/L 24  --  31  --  22         ASSESSMENT:   Severe septic shock   ascending cholangitis  Acute kidney injury  Lactic acidosis  Atrial fibrillation, probably stress reaction from sepsis  Hyperbilirubinemia  Ascites  Stage IV cholangiocarcinoma  Malnutrition  Bilateral pleural effusions  Hypokalemia  DNR/DNI    PLAN:  Terminal stage IV Breonna carcinoma, with cholangitis, and acute severe septic shock.  Patient will be transferred to oncology floor for comfort measures and to allow natural death.  Orders have been entered.  All other diagnostic testing, blood work and antibiotics have been discontinued.    Nash Campbell MD  4/9/2017

## 2017-04-09 NOTE — PROGRESS NOTES
She is not palliative. She is off all cardiac medications. Please let us know if we can help. I am signing off.

## 2017-04-09 NOTE — PLAN OF CARE
Problem: Pain, Acute (Adult)  Goal: Identify Related Risk Factors and Signs and Symptoms  Outcome: Ongoing (interventions implemented as appropriate)    Problem: Patient Care Overview (Adult)  Goal: Plan of Care Review  Outcome: Ongoing (interventions implemented as appropriate)

## 2017-04-09 NOTE — PLAN OF CARE
Problem: Pain, Acute (Adult)  Goal: Identify Related Risk Factors and Signs and Symptoms  Outcome: Ongoing (interventions implemented as appropriate)  Goal: Acceptable Pain Control/Comfort Level  Outcome: Ongoing (interventions implemented as appropriate)    Problem: Patient Care Overview (Adult)  Goal: Plan of Care Review  Outcome: Ongoing (interventions implemented as appropriate)    04/09/17 5756   Coping/Psychosocial Response Interventions   Plan Of Care Reviewed With patient;family   Patient Care Overview   Progress declining   Outcome Evaluation   Outcome Summary/Follow up Plan pt started on morphine and ativan prior to turns with good relief. pt has a skin tear that is weeping. using mediscus pad to absorb. palliative care discussed with the family. continue comfort care.       Goal: Adult Individualization and Mutuality  Outcome: Ongoing (interventions implemented as appropriate)  Goal: Discharge Needs Assessment  Outcome: Ongoing (interventions implemented as appropriate)    Problem: Fall Risk (Adult)  Goal: Identify Related Risk Factors and Signs and Symptoms  Outcome: Ongoing (interventions implemented as appropriate)  Goal: Absence of Falls  Outcome: Ongoing (interventions implemented as appropriate)    Problem: Dying Patient, Actively (Adult)  Goal: Identify Related Risk Factors and Signs and Symptoms  Outcome: Ongoing (interventions implemented as appropriate)  Goal: Comfort/Pain Control  Outcome: Ongoing (interventions implemented as appropriate)  Goal: Dying Process, Peace and Dignity  Outcome: Ongoing (interventions implemented as appropriate)

## 2017-04-09 NOTE — PROGRESS NOTES
"History:     Reason for Admission: sepsis liekly realted to cholangitis in the background of cholangiocarcinoma.  1. Stage IV, T3N0M1 cholangiocarcinoma.   2. Hospital follow up       HPI:   This patient was brought yesterday by the family to the emergency room with several days’ history of profound weakness and decline associated with changes in mental status, anorexia, nausea and some abdominal pain. At the time of the visit in the emergency room, the patient was hypotensive, tachycardic with a low grade temperature and it was thought that she was septic. She had a significant bump in her creatinine and she remained jaundiced. It was thought that she was again septic, probably a urinary tract infection, retrospectively. Somebody with cholangiocarcinoma typically has cholangitis even though most of the manifestations of this are not clinically that apparent. In any event the patient has declined to the point that she has required total care at home by her daughters and she is barely able to go to the bathroom on her own anymore. She requires feeding, dressing, bathing, and so forth. Her performance status has deteriorated. The patient has some cough and mucoid sputum production. Urinary output has decreased. Urine is very dark and she has mild abdominal pain. She has not had any rashes in the skin and she has remained jaundiced. At the time of the visit to the emergency room, again, her creatinine was elevated. She was jaundiced. Her bilirubin was elevated. Her sodium was low. Her potassium was high and she has been seen by a multitude of specialists including critical care, nephrology and cardiology this morning.         Past Medical History:   Diagnosis Date   • Anxiety    • Arthritis     \"bad right knee\"   • Bile duct cancer     Metastatic cholangiocarcinoma    • Broken arm     LEFT   • Cancer    • Diabetes mellitus    • Diarrhea due to drug    • History of transfusion    • Hypertension    • PONV (postoperative " nausea and vomiting)     and   Patient Active Problem List   Diagnosis   • Cholangiocarcinoma   • Hyperbilirubinemia   • Type 2 diabetes mellitus without complication   • Hyperglycemia   • Cancer related pain   • Fever and chills   • Chemotherapy-induced thrombocytopenia   • Anemia associated with chemotherapy   • Hypomagnesemia   • Hypokalemia   • Closed fracture of left olecranon process   • Wrist swelling   • Closed fracture of humerus   • Encounter for adjustment or management of vascular access device   • Bile duct obstruction   • Neutropenic fever   • Weakness   • Generalized weakness   • Emesis, persistent   • Acute UTI   • GERD (gastroesophageal reflux disease)   • Acute renal failure     Social History   Social History     Social History   • Marital status:      Spouse name: Carlitos   • Number of children: 4   • Years of education: High School     Occupational History   • 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 Reports she drinks about 1.2 0 1.8 oz of alcohol per week.      Family History  Family History   Problem Relation Age of Onset   • Heart disease Father    • Hypertension Mother      Allergies  Allergies   Allergen Reactions   • Tegaderm Ag Mesh [Silver] Dermatitis   • Morphine And Related Confusion   • Sulfa Antibiotics      Patient cannot remember reaction.   • Oxaliplatin Palpitations, Other (See Comments) and Angioedema     Severe flushing for head, neck and palms.  Requiring iv benadryl, iv pepcid and iv steroids (solucortef) to reverse       Medications: The current medication list was reviewed in the EMR.    Review of Systems  Review of  Systems   Constitutional: Positive for appetite change, chills and fatigue. Negative for diaphoresis and fever.   HENT: Negative for congestion, ear pain, sinus pressure, tinnitus and trouble swallowing.    Eyes: Negative for discharge, redness, itching and visual disturbance.   Respiratory: Negative for cough, chest tightness and shortness of breath.    Cardiovascular: Positive for leg swelling (chronic). Negative for chest pain and palpitations.   Gastrointestinal: Positive for abdominal distention. Negative for abdominal pain, blood in stool, diarrhea, nausea and vomiting.   Endocrine: Negative for cold intolerance, polydipsia and polyuria.   Genitourinary: Negative for difficulty urinating, dysuria and pelvic pain.   Musculoskeletal: Negative for joint swelling and myalgias.   Skin: Negative for color change, pallor, rash and wound.   Neurological: Negative for dizziness, seizures and weakness.   Hematological: Negative for adenopathy. Does not bruise/bleed easily.   Psychiatric/Behavioral: Negative for agitation and behavioral problems.       Objective:     Vitals:    04/08/17 2055 04/08/17 2203 04/09/17 0702 04/09/17 0802   BP: 96/64 107/75 99/54 (!) 78/59   BP Location:       Patient Position:       Pulse: 120      Resp:       Temp:       TempSrc:       SpO2: 99% 97% 97% 92%   Weight:       Height:         Current Status 4/3/2017   ECOG score 0   Physical Exam ecog 3 on 4/8/17  GENERAL:  Elderly thin female in no acute distress.profound weakness  SKIN:  Warm, dry without rashes, purpura or petechiae.  Jaundiced, pale  HEAD:  Normocephalic.  EYES:  EOMs intact.  Scleral icterus  EARS:  Hearing intact.  NOSE:  Septum midline.  No excoriations or nasal discharge.  CHEST:  Lungs clear to percussion and auscultation. Good airflow.  CARDIAC:  Regular rate and rhythm without murmurs, rubs or gallops. Normal S1,S2.  ABDOMEN:  Soft, nontender, no hepatosplenomegaly, normal bowel sounds, + distended with fluid  wave/ascites   EXTREMITIES:  No clubbing, cyanosis ; bilateral LE edema with wrappings on lower legs. PSYCHIATRIC:  Normal affect and mood.    Neuro: responsive to verbal commands, profound weakness.  Labs and Imaging  Results for orders placed or performed during the hospital encounter of 04/07/17   Blood Culture   Result Value Ref Range    Blood Culture No growth at 24 hours    Blood Culture   Result Value Ref Range    Blood Culture No growth at 24 hours    Comprehensive Metabolic Panel   Result Value Ref Range    Glucose 163 (H) 65 - 99 mg/dL    BUN 55 (H) 8 - 23 mg/dL    Creatinine 1.54 (H) 0.57 - 1.00 mg/dL    Sodium 129 (L) 136 - 145 mmol/L    Potassium 6.3 (C) 3.5 - 5.2 mmol/L    Chloride 96 (L) 98 - 107 mmol/L    CO2 18.0 (L) 22.0 - 29.0 mmol/L    Calcium 8.8 8.6 - 10.5 mg/dL    Total Protein 6.8 6.0 - 8.5 g/dL    Albumin 2.20 (L) 3.50 - 5.20 g/dL    ALT (SGPT) 31 1 - 33 U/L    AST (SGOT) 40 (H) 1 - 32 U/L    Alkaline Phosphatase 219 (H) 39 - 117 U/L    Total Bilirubin 5.7 (H) 0.1 - 1.2 mg/dL    eGFR Non African Amer 32 (L) >60 mL/min/1.73    Globulin 4.6 gm/dL    A/G Ratio 0.5 g/dL    BUN/Creatinine Ratio 35.7 (H) 7.0 - 25.0    Anion Gap 15.0 mmol/L   aPTT   Result Value Ref Range    PTT 40.3 (H) 22.7 - 35.4 seconds   Urinalysis With / Culture If Indicated   Result Value Ref Range    Color, UA Colette (A) Yellow, Straw    Appearance, UA Cloudy (A) Clear    pH, UA <=5.0 5.0 - 8.0    Specific Gravity, UA 1.018 1.005 - 1.030    Glucose, UA Negative Negative    Ketones, UA Trace (A) Negative    Bilirubin, UA Moderate (2+) (A) Negative    Blood, UA Large (3+) (A) Negative    Protein, UA Negative Negative    Leuk Esterase, UA Moderate (2+) (A) Negative    Nitrite, UA Negative Negative    Urobilinogen, UA 1.0 E.U./dL 0.2 - 1.0 E.U./dL   CK   Result Value Ref Range    Creatine Kinase 14 (L) 20 - 180 U/L   Magnesium   Result Value Ref Range    Magnesium 2.3 1.6 - 2.4 mg/dL   Phosphorus   Result Value Ref Range     Phosphorus 5.0 (H) 2.5 - 4.5 mg/dL   Lactic Acid, Plasma   Result Value Ref Range    Lactate 4.4 (C) 0.5 - 2.0 mmol/L   CBC Auto Differential   Result Value Ref Range    WBC 8.84 4.50 - 10.70 10*3/mm3    RBC 3.11 (L) 3.90 - 5.20 10*6/mm3    Hemoglobin 11.3 (L) 11.9 - 15.5 g/dL    Hematocrit 33.4 (L) 35.6 - 45.5 %    .4 (H) 80.5 - 98.2 fL    MCH 36.3 (H) 26.9 - 32.0 pg    MCHC 33.8 32.4 - 36.3 g/dL    RDW 18.4 (H) 11.7 - 13.0 %    RDW-SD 72.4 (H) 37.0 - 54.0 fl    MPV 11.4 6.0 - 12.0 fL    Platelets 123 (L) 140 - 500 10*3/mm3    Neutrophil % 92.4 (H) 42.7 - 76.0 %    Lymphocyte % 6.0 (L) 19.6 - 45.3 %    Monocyte % 0.3 (L) 5.0 - 12.0 %    Eosinophil % 1.2 0.3 - 6.2 %    Basophil % 0.1 0.0 - 1.5 %    Immature Grans % 0.0 0.0 - 0.5 %    Neutrophils, Absolute 8.16 (H) 1.90 - 8.10 10*3/mm3    Lymphocytes, Absolute 0.53 (L) 0.90 - 4.80 10*3/mm3    Monocytes, Absolute 0.03 (L) 0.20 - 1.20 10*3/mm3    Eosinophils, Absolute 0.11 0.00 - 0.70 10*3/mm3    Basophils, Absolute 0.01 0.00 - 0.20 10*3/mm3    Immature Grans, Absolute 0.00 0.00 - 0.03 10*3/mm3   Acetone   Result Value Ref Range    Acetone Negative Negative   Scan Slide   Result Value Ref Range    Dacrocytes Slight/1+ None Seen    Macrocytes Mod/2+ None Seen    Toxic Granulation Slight/1+ None Seen    Platelet Morphology Normal Normal   Protime-INR   Result Value Ref Range    Protime 19.4 (H) 11.7 - 14.2 Seconds    INR 1.70 (H) 0.90 - 1.10   Lactate Acid, Reflex   Result Value Ref Range    Lactate 3.7 (C) 0.5 - 2.0 mmol/L   Urinalysis, Microscopic Only   Result Value Ref Range    RBC, UA 6-12 (A) None Seen, 0-2 /HPF    WBC, UA 6-12 (A) None Seen, 0-2 /HPF    Bacteria, UA None Seen None Seen /HPF    Squamous Epithelial Cells, UA 3-6 (A) None Seen, 0-2 /HPF    Transitional Epithelial Cells, UA 0-2 0 - 2 /HPF    Hyaline Casts, UA 3-6 None Seen /LPF    Amorphous Crystals, UA Small/1+ None Seen /HPF    Methodology Manual Light Microscopy    Troponin   Result Value  Ref Range    Troponin T <0.010 0.000 - 0.030 ng/mL   Basic Metabolic Panel   Result Value Ref Range    Glucose 162 (H) 65 - 99 mg/dL    BUN 48 (H) 8 - 23 mg/dL    Creatinine 1.31 (H) 0.57 - 1.00 mg/dL    Sodium 132 (L) 136 - 145 mmol/L    Potassium 5.5 (H) 3.5 - 5.2 mmol/L    Chloride 101 98 - 107 mmol/L    CO2 17.7 (L) 22.0 - 29.0 mmol/L    Calcium 8.2 (L) 8.6 - 10.5 mg/dL    eGFR Non African Amer 39 (L) >60 mL/min/1.73    BUN/Creatinine Ratio 36.6 (H) 7.0 - 25.0    Anion Gap 13.3 mmol/L   Comprehensive Metabolic Panel   Result Value Ref Range    Glucose 136 (H) 65 - 99 mg/dL    BUN 52 (H) 8 - 23 mg/dL    Creatinine 1.27 (H) 0.57 - 1.00 mg/dL    Sodium 132 (L) 136 - 145 mmol/L    Potassium 5.7 (H) 3.5 - 5.2 mmol/L    Chloride 103 98 - 107 mmol/L    CO2 17.7 (L) 22.0 - 29.0 mmol/L    Calcium 8.1 (L) 8.6 - 10.5 mg/dL    Total Protein 5.2 (L) 6.0 - 8.5 g/dL    Albumin 1.90 (L) 3.50 - 5.20 g/dL    ALT (SGPT) 24 1 - 33 U/L    AST (SGOT) 33 (H) 1 - 32 U/L    Alkaline Phosphatase 157 (H) 39 - 117 U/L    Total Bilirubin 4.2 (H) 0.1 - 1.2 mg/dL    eGFR Non African Amer 40 (L) >60 mL/min/1.73    Globulin 3.3 gm/dL    A/G Ratio 0.6 g/dL    BUN/Creatinine Ratio 40.9 (H) 7.0 - 25.0    Anion Gap 11.3 mmol/L   CBC Auto Differential   Result Value Ref Range    WBC 7.62 4.50 - 10.70 10*3/mm3    RBC 2.29 (L) 3.90 - 5.20 10*6/mm3    Hemoglobin 8.0 (L) 11.9 - 15.5 g/dL    Hematocrit 24.0 (L) 35.6 - 45.5 %    .8 (H) 80.5 - 98.2 fL    MCH 34.9 (H) 26.9 - 32.0 pg    MCHC 33.3 32.4 - 36.3 g/dL    RDW 18.7 (H) 11.7 - 13.0 %    RDW-SD 71.6 (H) 37.0 - 54.0 fl    MPV 11.1 6.0 - 12.0 fL    Platelets 101 (L) 140 - 500 10*3/mm3    Neutrophil % 92.3 (H) 42.7 - 76.0 %    Lymphocyte % 6.0 (L) 19.6 - 45.3 %    Monocyte % 0.9 (L) 5.0 - 12.0 %    Eosinophil % 0.5 0.3 - 6.2 %    Basophil % 0.0 0.0 - 1.5 %    Immature Grans % 0.3 0.0 - 0.5 %    Neutrophils, Absolute 7.03 1.90 - 8.10 10*3/mm3    Lymphocytes, Absolute 0.46 (L) 0.90 - 4.80 10*3/mm3     Monocytes, Absolute 0.07 (L) 0.20 - 1.20 10*3/mm3    Eosinophils, Absolute 0.04 0.00 - 0.70 10*3/mm3    Basophils, Absolute 0.00 0.00 - 0.20 10*3/mm3    Immature Grans, Absolute 0.02 0.00 - 0.03 10*3/mm3    nRBC 0.0 0.0 - 0.0 /100 WBC   Troponin   Result Value Ref Range    Troponin T <0.010 0.000 - 0.030 ng/mL   Procalcitonin   Result Value Ref Range    Procalcitonin 3.40 (C) 0.10 - 0.25 ng/mL   Lactic Acid, Plasma   Result Value Ref Range    Lactate 2.9 (C) 0.5 - 2.0 mmol/L   T3, Free   Result Value Ref Range    T3, Free 1.49 (L) 2.00 - 4.40 pg/mL   T4, Free   Result Value Ref Range    Free T4 0.98 0.93 - 1.70 ng/dL   TSH   Result Value Ref Range    TSH 6.870 (H) 0.270 - 4.200 mIU/mL   BNP   Result Value Ref Range    proBNP 8641.0 (H) 0.0 - 1800.0 pg/mL   Chloride, Urine, Random   Result Value Ref Range    Chloride, Urine 34 mmol/L   Sodium, Urine, Random   Result Value Ref Range    Sodium, Urine <20 mmol/L   Protein / Creatinine Ratio, Urine   Result Value Ref Range    Protein/Creatinine Ratio, Urine 518.6 mg/G Crea    Creatinine, Urine 115.7 mg/dL    Total Protein, Urine 60.0 mg/dL   POC Glucose Fingerstick   Result Value Ref Range    Glucose 149 (H) 70 - 130 mg/dL   POC Glucose Fingerstick   Result Value Ref Range    Glucose 123 70 - 130 mg/dL   POC Glucose Fingerstick   Result Value Ref Range    Glucose 114 70 - 130 mg/dL     Assessment/Plan   Assessment/Plan:   1. This patient was admitted yesterday through the emergency room with several days history of profound deterioration in performance status requiring total care at home, anorexia, progressive jaundice, profound fatigue. She was found to be hypotensive, tachycardic and septic in the emergency room with acute kidney injury. Her bilirubin remains elevated. Obviously the patient has had tremendous difficulty during the last several weeks after the last cycle of chemotherapy and this just has an indicator of declining performance status, progressive  malignancy and probably a component of cholangitis.     Typically, sepsis associated with patients with cholangiocarcinoma is an indicator of cholangitis even though the clinical picture is not that dramatic in the overall picture in regard to abdominal pain and things of this nature. With so much manipulation of the biliary tree it is very common for this to be the final issue.        A lengthy conversation today with the patients nurse, the two daughters and the son-in-law in regards to deterioration of her performance status, the presence of atrial fibrillation with rapid ventricular response, the persistency of hypotension, and the significant peacefulness that she achieved after IV Ativan last night with no restlessness, no agitation, no obvious pain. I do believe that they are ready to proceed with transferring her to _____ for palliative symptom control terminal care. I am going to go ahead and decrease the IV rate. I am going to discontinue all the labs and proceed with palliative care orders. The patients son needs to be here to discuss issues with us regarding all the terminal care. It seems to me that he is not in sync with the overall clinical situation and deterioration that the patient has. Therefore, we will proceed with the care, transferring her to continue palliative symptom control. No more labs. No more radiological assessment. No more consultants. Just 1 day at a time until she passes. We have the obligation to give this patient the gift of peaceful death.

## 2017-04-10 NOTE — PLAN OF CARE
Problem: Patient Care Overview (Adult)  Goal: Plan of Care Review  Outcome: Ongoing (interventions implemented as appropriate)    04/10/17 8715   Coping/Psychosocial Response Interventions   Plan Of Care Reviewed With patient;family   Patient Care Overview   Progress declining   Outcome Evaluation   Outcome Summary/Follow up Plan patient premed prior to turns. patient's breathing remains shallow. patient having expiratory breathing moan but i do not believe patient is uncomfortable. will continue to monitor patient and provide comfort measures         Problem: Fall Risk (Adult)  Goal: Identify Related Risk Factors and Signs and Symptoms  Outcome: Outcome(s) achieved Date Met:  04/10/17  Goal: Absence of Falls  Outcome: Ongoing (interventions implemented as appropriate)    Problem: Dying Patient, Actively (Adult)  Goal: Comfort/Pain Control  Outcome: Ongoing (interventions implemented as appropriate)  Goal: Dying Process, Peace and Dignity  Outcome: Ongoing (interventions implemented as appropriate)

## 2017-04-10 NOTE — PROGRESS NOTES
Dr. ISIDRA Campbell    [address]    4/10/2017    Patient ID:  Name:  Charlee Barrera  MRN:  [id number]  1935  81 y.o.  female            CC/Reason for visit: Follow-up for sepsis, cholangitis, terminal cholangiocarcinoma    HPI: Patient is actively dying.  She is receiving proper comfort measures.  She is in no distress    Vitals:  Vitals:    04/09/17 0802 04/09/17 1104 04/09/17 1801 04/10/17 0437   BP: (!) 78/59 105/64 (!) 87/53 (!) 89/54   BP Location:  Right arm Right arm Right arm   Patient Position:  Lying Lying Lying   Pulse:  80 92 104   Resp:  16 16 14   Temp:  97.8 °F (36.6 °C) 96.7 °F (35.9 °C) 97 °F (36.1 °C)   TempSrc:  Oral Oral Oral   SpO2: 92% 96% 99% 90%   Weight:       Height:               Body mass index is 24 kg/(m^2).    Intake/Output Summary (Last 24 hours) at 04/10/17 1755  Last data filed at 04/10/17 0539   Gross per 24 hour   Intake                0 ml   Output               95 ml   Net              -95 ml       Exam:  Patient is quite swollen with peripheral edema.  In no distress.  Unarousable.  Having some audible respiratory secretions.                          Data Review:   I reviewed the patient's medications and new clinical results.  Lab Results   Component Value Date    CALCIUM 8.1 (L) 04/08/2017    PHOS 5.0 (H) 04/07/2017     Results from last 7 days  Lab Units 04/08/17  0559 04/08/17  0058 04/07/17  2247  04/07/17  2246   AST (SGOT) U/L 33*  --  40*  --   --    MAGNESIUM mg/dL  --   --  2.3  --   --    SODIUM mmol/L 132* 132* 129*  --   --    POTASSIUM mmol/L 5.7* 5.5* 6.3*  --   --    CHLORIDE mmol/L 103 101 96*  --   --    TOTAL CO2 mmol/L 17.7* 17.7* 18.0*  --   --    BUN mg/dL 52* 48* 55*  --   --    CREATININE mg/dL 1.27* 1.31* 1.54*  --   --    GLUCOSE mg/dL 136* 162* 163*  < >  --    CALCIUM mg/dL 8.1* 8.2* 8.8  --   --    WBC 10*3/mm3 7.62  --   --   --  8.84   HEMOGLOBIN g/dL 8.0*  --   --   --  11.3*   PLATELETS 10*3/mm3 101*  --   --   --  123*    ALT (SGPT) U/L 24  --  31  --   --    < > = values in this interval not displayed.    ASSESSMENT:   Severe septic shock  ascending cholangitis  Acute kidney injury  Lactic acidosis  Atrial fibrillation, probably stress reaction from sepsis  Hyperbilirubinemia  Ascites  Stage IV cholangiocarcinoma  Malnutrition  Bilateral pleural effusions  Hypokalemia  DNR/DNI    PLAN:  Continue palliative care with comfort measures only.  Allow for natural death.  Family at bedside and all questions and concerns were answered.    The patient is in no distress    Nash Campbell MD  4/10/2017

## 2017-04-10 NOTE — PROGRESS NOTES
Discharge Planning Assessment  [facility]     Patient Name: Charlee Barrera  MRN: [id number]  Today's Date: 4/10/2017    Admit Date: 4/7/2017          Discharge Needs Assessment       04/10/17 1703    Discharge Needs Assessment    Equipment Currently Used at Home bath bench;walker, rolling;grab bar;raised toilet    Equipment Needed After Discharge none    Discharge Disposition --   palliative/comfort care      04/10/17 1702    Living Environment    Lives With spouse    Living Arrangements house    Provides Primary Care For no one    Quality Of Family Relationships supportive    Able to Return to Prior Living Arrangements yes    Discharge Needs Assessment    Concerns To Be Addressed grief and loss concerns            Discharge Plan       04/10/17 1705    Case Management/Social Work Plan    Plan The patient is palliative /comfort care    Additional Comments CCP will follow for any needs that may arise. ANNABELLA Pritchrad RN CCP      04/10/17 1421    Case Management/Social Work Plan    Additional Comments No [facility] consult at this time. ANNABELLA Pritchard RN, CCP.      04/10/17 1427    Case Management/Social Work Plan    Additional Comments The patient transferred to [facility] from CCU on 4/9/17 @ 10:54. The patient is palliative. CCP will follow. ANNABELLA Pritchard RN, [facility]        Discharge Placement     No information found                Demographic Summary       04/10/17 1700    Referral Information    Admission Type inpatient    Arrived From admitted as an inpatient    Referral Source admission list    Reason For Consult palliative care    Record Reviewed clinical discipline documentation;history and physical;medical record;patient profile;plan of care    Contact Information    Permission Granted to Share Information With             Functional Status       04/10/17 1704    Functional Status Current    Ambulation 4-->completely dependent    Transferring 4-->completely dependent    Toileting 4-->completely dependent     Bathing 4-->completely dependent    Dressing 4-->completely dependent    Eating 4-->completely dependent    Communication 3-->unable to understand or speak (not related to language barrier)    Swallowing (if score 2 or more for any item, consult Rehab Services) 2-->difficulty swallowing liquids/foods    Change in Functional Status Since Onset of Current Illness/Injury yes    Functional Status Prior    Ambulation 1-->assistive equipment    Transferring 1-->assistive equipment    Toileting 1-->assistive equipment    Bathing 1-->assistive equipment    Dressing 0-->independent    Eating 0-->independent    Communication 0-->understands/communicates without difficulty    Swallowing 0-->swallows foods/liquids without difficulty    Activity Tolerance    Current Activity Tolerance poor    Cognitive/Perceptual/Developmental    Current Mental Status/Cognitive Functioning unable to assess    Recent Changes in Mental Status/Cognitive Functioning unable to assess    Developmental Stage (Eriksson's Stages of Development) Stage 8 (65 years-death/Late Adulthood) Integrity vs. Despair    Employment/Financial    Source Of Income social security    Financial Concerns none    Application For Public Assistance not applied            Psychosocial     None            Abuse/Neglect     None            Legal     None            Substance Abuse     None            Patient Forms     None          Eri Pritchard, SEBASTIAN

## 2017-04-10 NOTE — PROGRESS NOTES
Discharge Planning Assessment  [facility]     Patient Name: Charlee Barrera  MRN: [id number]  Today's Date: 4/10/2017    Admit Date: 4/7/2017          Discharge Needs Assessment     None            Discharge Plan       04/10/17 1424    Case Management/Social Work Plan    Additional Comments No [facility] consult at this time. ANNABELLA Pritchard RN, CCP.      04/10/17 1423    Case Management/Social Work Plan    Additional Comments The patient transferred to [facility] from CCU on 4/9/17 @ 10:54. The patient is palliative. CCP will follow. ANNABELLA Pritchard RN, CCP        Discharge Placement     No information found                Demographic Summary     None            Functional Status     None            Psychosocial     None            Abuse/Neglect     None            Legal     None            Substance Abuse     None            Patient Forms     None          Eri Pritchard, RN

## 2017-04-10 NOTE — PLAN OF CARE
Problem: Patient Care Overview (Adult)  Goal: Plan of Care Review  Outcome: Ongoing (interventions implemented as appropriate)    04/09/17 1856 04/09/17 2239 04/10/17 0401   Coping/Psychosocial Response Interventions   Plan Of Care Reviewed With --  family --    Patient Care Overview   Progress declining --  --    Outcome Evaluation   Outcome Summary/Follow up Plan --  --  Pt has declined tonight. Breathing between 6-8 breaths per min. Family at bedside. Medicated prior to Q4 turns. Continue comfort care.        Goal: Adult Individualization and Mutuality  Outcome: Ongoing (interventions implemented as appropriate)  Goal: Discharge Needs Assessment  Outcome: Ongoing (interventions implemented as appropriate)    Problem: Dying Patient, Actively (Adult)  Goal: Identify Related Risk Factors and Signs and Symptoms  Outcome: Outcome(s) achieved Date Met:  04/10/17  Goal: Comfort/Pain Control  Outcome: Ongoing (interventions implemented as appropriate)  Goal: Dying Process, Peace and Dignity  Outcome: Ongoing (interventions implemented as appropriate)

## 2017-04-11 NOTE — PROGRESS NOTES
Discharge Planning Assessment  [facility]     Patient Name: Charlee Barrera  MRN: [id number]  Today's Date: 2017    Admit Date: 2017          Discharge Needs Assessment     None            Discharge Plan       17 1345    Final Note    Final Note The patient  on 17 @ 02:50. ANNABELLA Pritchard RN CCP        Discharge Placement     No information found        Expected Discharge Date and Time     Expected Discharge Date Expected Discharge Time    2017               Demographic Summary     None            Functional Status     None            Psychosocial     None            Abuse/Neglect     None            Legal     None            Substance Abuse     None            Patient Forms     None          Eri Pritchard, RN

## 2017-04-12 LAB
BACTERIA SPEC AEROBE CULT: ABNORMAL
GRAM STN SPEC: ABNORMAL

## 2017-04-13 LAB — BACTERIA SPEC AEROBE CULT: NORMAL

## 2017-04-17 ENCOUNTER — APPOINTMENT (OUTPATIENT)
Dept: ONCOLOGY | Facility: HOSPITAL | Age: 82
End: 2017-04-17

## 2017-04-17 ENCOUNTER — APPOINTMENT (OUTPATIENT)
Dept: ONCOLOGY | Facility: CLINIC | Age: 82
End: 2017-04-17

## 2017-04-18 LAB — GLUCOSE BLDC GLUCOMTR-MCNC: 119 MG/DL (ref 70–130)

## 2018-02-28 NOTE — ED PROVIDER NOTES
" EMERGENCY DEPARTMENT ENCOUNTER    CHIEF COMPLAINT  Chief Complaint: altered mental status  History given by: patient, family  History limited by: nothing  Room Number: 11/11  PMD: Michele Peguero MD  Oncologist: Dr. Sherley MD    HPI:  Pt is a 81 y.o. female who presents complaining of worsening generalized weakness and fatigue. Pt also c/o increase swelling to her BLE. Pt's family states the Pt has had increased confusion, \"doesn't know day from night\", and agitation. Pt's family also states the Pt has had decreased PO intake. Pt denies chest pain, shortness of breath, cough, abdominal pain, and dysuria. Pt last chemo treatment was 2/23/17.     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   • Hyperbilirubinemia 03/07/2016   • Type 2 diabetes mellitus without complication 03/10/2016   • Hyperglycemia 03/19/2016   • Cancer related pain 03/19/2016   • Fever and chills 03/19/2016   • Chemotherapy-induced thrombocytopenia 03/20/2016   • Anemia associated with chemotherapy 03/20/2016   • Hypomagnesemia 03/20/2016   • Hypokalemia 03/20/2016   • Closed fracture of left olecranon process 07/12/2016   • Wrist swelling 07/15/2016   • Closed fracture of humerus 07/15/2016   • Encounter for adjustment or management of vascular access device 07/29/2016   • 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   • Neutropenic fever 10/12/2016   • Weakness 11/07/2016   • Generalized weakness 11/08/2016   • Emesis, persistent 11/08/2016     Resolved Ambulatory Problems     Diagnosis Date Noted   • Thrombocytosis 03/07/2016     Past Medical History   Diagnosis Date   • Anxiety    • " Arthritis    • Bile duct cancer    • Broken arm    • Cancer    • Diabetes mellitus    • Diarrhea due to drug    • History of transfusion    • Hypertension    • PONV (postoperative nausea and vomiting)        PAST SURGICAL HISTORY  Past Surgical History   Procedure Laterality Date   • Hysterectomy  1986   • Pr insj tunneled cvc w/o subq port/ age 6 yr/> Right 3/7/2016     Procedure: INSERTION VENOUS ACCESS DEVICE, Power Port Placement, sonogram and fluroscopy;  Surgeon: Rich Keita MD;  [place] [facility] [place] [facility]  Service: General   • Cataract extraction Bilateral 2015   • Tonsillectomy     • Abdominal surgery     • Pr ercp dx collection specimen brushing/washing N/A 8/8/2016     Procedure: ENDOSCOPIC RETROGRADE CHOLANGIOPANCREATOGRAPHY with balloon sweep;  Surgeon: Herb Buckner MD;  Location: [facility] ENDOSCOPY;  Service: Gastroenterology   • Endoscopy N/A 11/17/2016     Procedure: ESOPHAGOGASTRODUODENOSCOPY  ;  Surgeon: Herb Buckner MD;  Location: [facility] ENDOSCOPY;  Service:        FAMILY HISTORY  Family History   Problem Relation Age of Onset   • Heart disease Father    • Hypertension Mother        SOCIAL HISTORY  Social History     Social History   • Marital status:      Spouse name: Carlitos   • Number of children: 4   • Years of education: High School     Occupational History   • 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 Reports she drinks about 1.2 0 1.8 oz of alcohol per week.        ALLERGIES  Morphine and related; Sulfa antibiotics; and Oxaliplatin    REVIEW OF SYSTEMS  Review of Systems   Constitutional: Positive for appetite change  (decreased) and fatigue. Negative for chills and fever.   HENT: Negative for sore throat and trouble swallowing.    Eyes: Negative for visual disturbance.   Respiratory: Negative for cough and shortness of breath.    Cardiovascular: Positive for leg swelling (worsening). Negative for chest pain and palpitations.   Gastrointestinal: Negative for abdominal pain, diarrhea and vomiting.   Endocrine: Negative.    Genitourinary: Negative for decreased urine volume, dysuria and frequency.   Musculoskeletal: Negative for neck pain.   Skin: Negative for rash.   Allergic/Immunologic: Negative.    Neurological: Positive for weakness (generalized). Negative for syncope, numbness and headaches.   Hematological: Negative.    Psychiatric/Behavioral: Positive for agitation and confusion.   All other systems reviewed and are negative.      PHYSICAL EXAM  ED Triage Vitals   Temp Heart Rate Resp BP SpO2   03/04/17 2030 03/04/17 2030 03/04/17 2030 03/04/17 2033 03/04/17 2030   98.2 °F (36.8 °C) 117 16 116/67 95 %      Temp src Heart Rate Source Patient Position BP Location FiO2 (%)   -- 03/04/17 2221 03/04/17 2033 03/04/17 2033 --    Monitor Sitting Left arm        Physical Exam   Constitutional: She is oriented to person, place, and time and well-developed, well-nourished, and in no distress. No distress.   Chronically ill appearing   HENT:   Head: Normocephalic and atraumatic.   Eyes: EOM are normal. Pupils are equal, round, and reactive to light.   Neck: Normal range of motion. Neck supple.   Cardiovascular: Regular rhythm.  Tachycardia present.    Murmur heard.   Systolic murmur is present with a grade of 2/6   Pulmonary/Chest: Effort normal and breath sounds normal. No respiratory distress.   Abdominal: Soft. She exhibits distension. There is no tenderness. There is no rebound and no guarding.   Musculoskeletal: Normal range of motion. She exhibits edema (4+ pitting, BLE).   Neurological: She is alert and oriented to person,  place, and time. She has normal sensation and normal strength.   Skin: Skin is warm and dry. No rash noted.   Psychiatric: Mood and affect normal.   Nursing note and vitals reviewed.      LAB RESULTS  Lab Results (last 24 hours)     Procedure Component Value Units Date/Time    CBC & Differential [[id number]] Collected:  03/04/17 2049    Specimen:  Blood Updated:  03/04/17 2122    Narrative:       The following orders were created for panel order CBC & Differential.  Procedure                               Abnormality         Status                     ---------                               -----------         ------                     Manual Differential[[id number]]           Abnormal            Final result               Scan Slide[[id number]]                                        Final result               CBC Auto Differential[[id number]]         Abnormal            Final result                 Please view results for these tests on the individual orders.    Comprehensive Metabolic Panel [[id number]]  (Abnormal) Collected:  03/04/17 2049    Specimen:  Blood Updated:  03/04/17 2121     Glucose 184 (H) mg/dL      BUN 16 mg/dL      Creatinine 0.82 mg/dL      Sodium 132 (L) mmol/L      Potassium 5.3 (H) mmol/L      Chloride 98 mmol/L      CO2 21.9 (L) mmol/L      Calcium 8.7 mg/dL      Total Protein 6.8 g/dL      Albumin 2.60 (L) g/dL      ALT (SGPT) 22 U/L      AST (SGOT) 37 (H) U/L      Alkaline Phosphatase 225 (H) U/L      Total Bilirubin 2.5 (H) mg/dL      eGFR Non African Amer 67 mL/min/1.73      Globulin 4.2 gm/dL      A/G Ratio 0.6 g/dL      BUN/Creatinine Ratio 19.5      Anion Gap 12.1 mmol/L     Narrative:       The MDRD GFR formula is only valid for adults with stable renal function between ages 18 and 70.    Troponin [[id number]]  (Normal) Collected:  03/04/17 2049    Specimen:  Blood Updated:  03/04/17 2120     Troponin T <0.010 ng/mL     Narrative:       Troponin T Reference Ranges:  Less than 0.03 ng/mL:     Negative for AMI  0.03 to 0.09 ng/mL:      Indeterminant for AMI  Greater than 0.09 ng/mL: Positive for AMI    Magnesium [[id number]]  (Abnormal) Collected:  03/04/17 2049    Specimen:  Blood Updated:  03/04/17 2121     Magnesium 1.5 (L) mg/dL     CBC Auto Differential [[id number]]  (Abnormal) Collected:  03/04/17 2049    Specimen:  Blood Updated:  03/04/17 2122     WBC 13.80 (H) 10*3/mm3      RBC 2.34 (L) 10*6/mm3      Hemoglobin 8.2 (L) g/dL      Hematocrit 23.4 (L) %      .0 (H) fL      MCH 35.0 (H) pg      MCHC 35.0 g/dL      RDW 23.0 (H) %      RDW-SD 76.2 (H) fl      MPV 10.3 fL      Platelets 105 (L) 10*3/mm3     Scan Slide [[id number]] Collected:  03/04/17 2049    Specimen:  Blood Updated:  03/04/17 2122     Scan Slide --       See Manual Differential Results       Manual Differential [[id number]]  (Abnormal) Collected:  03/04/17 2049    Specimen:  Blood Updated:  03/04/17 2122     Neutrophil % 87.0 (H) %      Lymphocyte % 4.0 (L) %      Monocyte % 5.0 %      Eosinophil % 1.0 %      Metamyelocyte % 1.0 (H) %      Myelocyte % 2.0 (H) %      Neutrophils Absolute 12.01 (H) 10*3/mm3      Lymphocytes Absolute 0.55 (L) 10*3/mm3      Monocytes Absolute 0.69 10*3/mm3      Eosinophils Absolute 0.14 10*3/mm3      Anisocytosis Mod/2+      Ovalocytes Slight/1+      Toxic Granulation Slight/1+      Platelet Morphology Normal     POC Glucose Fingerstick [[id number]]  (Abnormal) Collected:  03/04/17 2139    Specimen:  Blood Updated:  03/04/17 2142     Glucose 156 (H) mg/dL     Narrative:       Meter: [id number] : [id number] Maciej Watson    Urinalysis With / Culture If Indicated [[id number]]  (Abnormal) Collected:  03/04/17 2218    Specimen:  Urine from Urine, Clean Catch Updated:  03/04/17 2239     Color, UA Dark Yellow (A)      Appearance, UA Cloudy (A)      pH, UA <=5.0      Specific Gravity, UA 1.014      Glucose, UA Negative      Ketones, UA Negative      Bilirubin, UA Negative      Blood, UA Negative       Protein, UA Negative      Leuk Esterase, UA Negative      Nitrite, UA Positive (A)      Urobilinogen, UA 0.2 E.U./dL     Urinalysis, Microscopic Only [[id number]]  (Abnormal) Collected:  03/04/17 2218    Specimen:  Urine from Urine, Clean Catch Updated:  03/04/17 2239     RBC, UA 0-2 (A) /HPF      WBC, UA 0-2 (A) /HPF      Bacteria, UA 4+ (A) /HPF      Squamous Epithelial Cells, UA 0-2 /HPF      Hyaline Casts, UA 0-2 /LPF      Methodology Automated Microscopy     Urine Culture [[id number]] Collected:  03/04/17 2218    Specimen:  Urine from Urine, Clean Catch Updated:  03/04/17 2233      I ordered the above labs and reviewed the results    RADIOLOGY  XR Chest 1 View   Negative acute      I ordered the above noted radiological studies. Interpreted by radiologist. Reviewed by me in PACS.       PROCEDURES  Procedures      PROGRESS AND CONSULTS  ED Course     10:58 PM  Ordered rocephin for UTI.     11:06 PM  Discussed Pt's case with Dr. Leslie MD who agrees with the plan of care and will consult.     11:35 Discussed case with Dr Hendrix, will admit.      MEDICAL DECISION MAKING  Results were reviewed/discussed with the patient and they were also made aware of online access. Pt also made aware that some labs, such as cultures, will not be resulted during ER visit and follow up with PMD is necessary.     MDM  Number of Diagnoses or Management Options     Amount and/or Complexity of Data Reviewed  Clinical lab tests: reviewed and ordered  Tests in the radiology section of CPT®: ordered and reviewed (CXR: Negative acute)  Tests in the medicine section of CPT®: ordered and reviewed  Decide to obtain previous medical records or to obtain history from someone other than the patient: yes  Independent visualization of images, tracings, or specimens: yes    Patient Progress  Patient progress: stable         DIAGNOSIS  Final diagnoses:   Acute UTI   Leukocytosis, unspecified type   Generalized weakness        DISPOSITION  Admitted    Latest Documented Vital Signs:  As of 11:10 PM  BP- 102/75 HR- 98 Temp- 98.2 °F (36.8 °C) O2 sat- 100%    --  Documentation assistance provided by deyvi Ordoñez for Rey Sweet PA-C.  Information recorded by the scribe was done at my direction and has been verified and validated by me.         Suyapa Ordoñez  03/04/17 9304       KRYSTAL Garcia  03/04/17 8527     Family

## (undated) DEVICE — CANNULA,ADULT,SOFT-TOUCH,7TUBE,SC: Brand: MEDLINE

## (undated) DEVICE — BITEBLOCK OMNI BLOC

## (undated) DEVICE — DEV LK WIREGUIDE FUSN OLYMP SCP

## (undated) DEVICE — TUBING, SUCTION, 1/4" X 10', STRAIGHT: Brand: MEDLINE

## (undated) DEVICE — Device: Brand: DEFENDO AIR/WATER/SUCTION AND BIOPSY VALVE

## (undated) DEVICE — RETRIEVAL BALLOON CATHETER: Brand: EXTRACTOR™ PRO RX

## (undated) DEVICE — ERBE NESSY®PLATE 170 SPLIT; 168CM²; CABLE 3M: Brand: ERBE

## (undated) DEVICE — GW JAG HYDRA STR .035IN 450CM